# Patient Record
Sex: FEMALE | Race: WHITE | NOT HISPANIC OR LATINO | Employment: FULL TIME | URBAN - METROPOLITAN AREA
[De-identification: names, ages, dates, MRNs, and addresses within clinical notes are randomized per-mention and may not be internally consistent; named-entity substitution may affect disease eponyms.]

---

## 2017-12-04 ENCOUNTER — OFFICE VISIT (OUTPATIENT)
Dept: URGENT CARE | Facility: PHYSICIAN GROUP | Age: 47
End: 2017-12-04
Payer: COMMERCIAL

## 2017-12-04 ENCOUNTER — HOSPITAL ENCOUNTER (OUTPATIENT)
Dept: RADIOLOGY | Facility: MEDICAL CENTER | Age: 47
End: 2017-12-04
Attending: EMERGENCY MEDICINE
Payer: COMMERCIAL

## 2017-12-04 VITALS
RESPIRATION RATE: 16 BRPM | HEIGHT: 62 IN | WEIGHT: 128 LBS | DIASTOLIC BLOOD PRESSURE: 72 MMHG | BODY MASS INDEX: 23.55 KG/M2 | TEMPERATURE: 98.2 F | OXYGEN SATURATION: 98 % | HEART RATE: 99 BPM | SYSTOLIC BLOOD PRESSURE: 132 MMHG

## 2017-12-04 DIAGNOSIS — S16.1XXA STRAIN OF NECK MUSCLE, INITIAL ENCOUNTER: ICD-10-CM

## 2017-12-04 PROCEDURE — 99213 OFFICE O/P EST LOW 20 MIN: CPT | Performed by: EMERGENCY MEDICINE

## 2017-12-04 PROCEDURE — 72040 X-RAY EXAM NECK SPINE 2-3 VW: CPT

## 2017-12-04 RX ORDER — HYDROCODONE BITARTRATE AND ACETAMINOPHEN 5; 325 MG/1; MG/1
1 TABLET ORAL EVERY 6 HOURS PRN
Qty: 5 TAB | Refills: 0 | Status: SHIPPED | OUTPATIENT
Start: 2017-12-04 | End: 2018-01-05

## 2017-12-04 RX ORDER — METHOCARBAMOL 750 MG/1
1500 TABLET, FILM COATED ORAL 3 TIMES DAILY
Qty: 60 TAB | Refills: 0 | Status: SHIPPED | OUTPATIENT
Start: 2017-12-04 | End: 2018-01-05

## 2017-12-04 ASSESSMENT — ENCOUNTER SYMPTOMS
MYALGIAS: 1
NAUSEA: 0
VOMITING: 0
CHILLS: 0
FEVER: 0
NECK PAIN: 1
TREMORS: 0
EYE REDNESS: 0
EYE DISCHARGE: 0
FALLS: 0
SENSORY CHANGE: 0
COUGH: 0
NERVOUS/ANXIOUS: 0

## 2017-12-05 NOTE — PROGRESS NOTES
Subjective:      Madison Cardona is a 47 y.o. female who presents with Neck Pain (and L shoulder pain x1 week, was injured 15 years ago)            HPI  Patient is a pleasant 47-year-old female complaining of acute onset of left sided neck and shoulder pain approximately a week ago. The pain came on without any specific injury, she did go to a chiropractor without much relief. Should problems she's had for the past 15 years approximately every 3 years since injuring her left neck and shoulder at a picnic where her  is worried.  No Known Allergies  Social History     Social History   • Marital status:      Spouse name: N/A   • Number of children: N/A   • Years of education: N/A     Occupational History   • Not on file.     Social History Main Topics   • Smoking status: Never Smoker   • Smokeless tobacco: Never Used   • Alcohol use 1.2 oz/week     2 Glasses of wine per week      Comment: occ.   • Drug use: No   • Sexual activity: Yes     Partners: Male     Other Topics Concern   • Not on file     Social History Narrative   • No narrative on file   History reviewed. No pertinent past medical history..    Current Outpatient Prescriptions on File Prior to Visit   Medication Sig Dispense Refill   • cyanocobalamin (VITAMIN B-12) 100 MCG Tab Take 100 mcg by mouth every day.     • vitamin D (CHOLECALCIFEROL) 1000 UNIT Tab Take 1,000 Units by mouth every day.     • Multiple Vitamins-Minerals (MULTIVITAMIN & MINERAL PO) Take  by mouth.       No current facility-administered medications on file prior to visit.      Family History   Problem Relation Age of Onset   • Cancer Mother      breast   • Alcohol/Drug Father      heroin addict   • Lung Disease Father    • Alcohol/Drug Paternal Aunt      Review of Systems   Constitutional: Negative for chills and fever.   Eyes: Negative for discharge and redness.   Respiratory: Negative for cough.    Cardiovascular: Negative for chest pain.   Gastrointestinal: Negative for  "nausea and vomiting.   Musculoskeletal: Positive for myalgias and neck pain. Negative for falls.   Skin: Negative for rash.   Neurological: Negative for tremors and sensory change.   Psychiatric/Behavioral: The patient is not nervous/anxious.           Objective:     /72   Pulse 99   Temp 36.8 °C (98.2 °F)   Resp 16   Ht 1.575 m (5' 2\")   Wt 58.1 kg (128 lb)   SpO2 98%   Breastfeeding? No   BMI 23.41 kg/m²      Physical Exam   Constitutional: She appears well-developed and well-nourished. She appears distressed.   HENT:   Head: Normocephalic and atraumatic.   Right Ear: External ear normal.   Left Ear: External ear normal.   Eyes: Right eye exhibits no discharge. Left eye exhibits no discharge.   Neck: No thyromegaly present.   Tender on mild left paraspinous musculature C4-C7. Tender in the left trapezius in addition.   Skin: She is not diaphoretic.               Assessment/Plan:     1. Strain of neck muscle, initial encounter    - SLINGS    Patient have an x-ray today as well as he placed his sling she seems to have slight improvement while in the sling. Over the next 4-5 days she will rest her left upper extremity. She'll have Norco to use at night.    Historically this has resolved after a period of 5 minus days and hopefully that will be the case if not she needs to follow-up with her primary care provider.  "

## 2018-01-05 ENCOUNTER — HOSPITAL ENCOUNTER (OUTPATIENT)
Dept: RADIOLOGY | Facility: MEDICAL CENTER | Age: 48
DRG: 473 | End: 2018-01-05
Attending: ORTHOPAEDIC SURGERY | Admitting: ORTHOPAEDIC SURGERY
Payer: COMMERCIAL

## 2018-01-05 DIAGNOSIS — Z01.810 PRE-OPERATIVE CARDIOVASCULAR EXAMINATION: ICD-10-CM

## 2018-01-05 DIAGNOSIS — Z01.812 PRE-OPERATIVE LABORATORY EXAMINATION: ICD-10-CM

## 2018-01-05 DIAGNOSIS — Z01.811 PRE-OPERATIVE RESPIRATORY EXAMINATION: ICD-10-CM

## 2018-01-05 LAB
ANION GAP SERPL CALC-SCNC: 8 MMOL/L (ref 0–11.9)
APPEARANCE UR: CLEAR
BACTERIA #/AREA URNS HPF: NEGATIVE /HPF
BASOPHILS # BLD AUTO: 0.5 % (ref 0–1.8)
BASOPHILS # BLD: 0.03 K/UL (ref 0–0.12)
BILIRUB UR QL STRIP.AUTO: NEGATIVE
BUN SERPL-MCNC: 17 MG/DL (ref 8–22)
CALCIUM SERPL-MCNC: 9 MG/DL (ref 8.5–10.5)
CHLORIDE SERPL-SCNC: 104 MMOL/L (ref 96–112)
CO2 SERPL-SCNC: 26 MMOL/L (ref 20–33)
COLOR UR: YELLOW
CREAT SERPL-MCNC: 1.02 MG/DL (ref 0.5–1.4)
CULTURE IF INDICATED INDCX: YES UA CULTURE
EKG IMPRESSION: NORMAL
EOSINOPHIL # BLD AUTO: 0.09 K/UL (ref 0–0.51)
EOSINOPHIL NFR BLD: 1.6 % (ref 0–6.9)
EPI CELLS #/AREA URNS HPF: NEGATIVE /HPF
ERYTHROCYTE [DISTWIDTH] IN BLOOD BY AUTOMATED COUNT: 45.2 FL (ref 35.9–50)
GFR SERPL CREATININE-BSD FRML MDRD: 58 ML/MIN/1.73 M 2
GLUCOSE SERPL-MCNC: 92 MG/DL (ref 65–99)
GLUCOSE UR STRIP.AUTO-MCNC: NEGATIVE MG/DL
HCT VFR BLD AUTO: 42.1 % (ref 37–47)
HGB BLD-MCNC: 13.9 G/DL (ref 12–16)
HYALINE CASTS #/AREA URNS LPF: NORMAL /LPF
IMM GRANULOCYTES # BLD AUTO: 0.02 K/UL (ref 0–0.11)
IMM GRANULOCYTES NFR BLD AUTO: 0.4 % (ref 0–0.9)
KETONES UR STRIP.AUTO-MCNC: NEGATIVE MG/DL
LEUKOCYTE ESTERASE UR QL STRIP.AUTO: ABNORMAL
LYMPHOCYTES # BLD AUTO: 1.98 K/UL (ref 1–4.8)
LYMPHOCYTES NFR BLD: 35.3 % (ref 22–41)
MCH RBC QN AUTO: 31.4 PG (ref 27–33)
MCHC RBC AUTO-ENTMCNC: 33 G/DL (ref 33.6–35)
MCV RBC AUTO: 95.2 FL (ref 81.4–97.8)
MICRO URNS: ABNORMAL
MONOCYTES # BLD AUTO: 0.41 K/UL (ref 0–0.85)
MONOCYTES NFR BLD AUTO: 7.3 % (ref 0–13.4)
NEUTROPHILS # BLD AUTO: 3.08 K/UL (ref 2–7.15)
NEUTROPHILS NFR BLD: 54.9 % (ref 44–72)
NITRITE UR QL STRIP.AUTO: NEGATIVE
NRBC # BLD AUTO: 0 K/UL
NRBC BLD-RTO: 0 /100 WBC
PH UR STRIP.AUTO: 8 [PH]
PLATELET # BLD AUTO: 146 K/UL (ref 164–446)
PMV BLD AUTO: 9.6 FL (ref 9–12.9)
POTASSIUM SERPL-SCNC: 3.6 MMOL/L (ref 3.6–5.5)
PROT UR QL STRIP: NEGATIVE MG/DL
RBC # BLD AUTO: 4.42 M/UL (ref 4.2–5.4)
RBC # URNS HPF: NORMAL /HPF
RBC UR QL AUTO: NEGATIVE
SODIUM SERPL-SCNC: 138 MMOL/L (ref 135–145)
SP GR UR STRIP.AUTO: 1.02
UROBILINOGEN UR STRIP.AUTO-MCNC: 1 MG/DL
WBC # BLD AUTO: 5.6 K/UL (ref 4.8–10.8)
WBC #/AREA URNS HPF: NORMAL /HPF

## 2018-01-05 PROCEDURE — 71045 X-RAY EXAM CHEST 1 VIEW: CPT

## 2018-01-05 PROCEDURE — 36415 COLL VENOUS BLD VENIPUNCTURE: CPT

## 2018-01-05 PROCEDURE — 81001 URINALYSIS AUTO W/SCOPE: CPT

## 2018-01-05 PROCEDURE — 87086 URINE CULTURE/COLONY COUNT: CPT

## 2018-01-05 PROCEDURE — 93005 ELECTROCARDIOGRAM TRACING: CPT

## 2018-01-05 PROCEDURE — 93010 ELECTROCARDIOGRAM REPORT: CPT | Performed by: INTERNAL MEDICINE

## 2018-01-05 PROCEDURE — 80048 BASIC METABOLIC PNL TOTAL CA: CPT

## 2018-01-05 PROCEDURE — 85025 COMPLETE CBC W/AUTO DIFF WBC: CPT

## 2018-01-07 LAB
BACTERIA UR CULT: NORMAL
SIGNIFICANT IND 70042: NORMAL
SITE SITE: NORMAL
SOURCE SOURCE: NORMAL

## 2018-01-10 ENCOUNTER — APPOINTMENT (OUTPATIENT)
Dept: RADIOLOGY | Facility: MEDICAL CENTER | Age: 48
DRG: 473 | End: 2018-01-10
Attending: ORTHOPAEDIC SURGERY
Payer: COMMERCIAL

## 2018-01-10 ENCOUNTER — HOSPITAL ENCOUNTER (INPATIENT)
Facility: MEDICAL CENTER | Age: 48
LOS: 1 days | DRG: 473 | End: 2018-01-11
Attending: ORTHOPAEDIC SURGERY | Admitting: ORTHOPAEDIC SURGERY
Payer: COMMERCIAL

## 2018-01-10 PROBLEM — M48.02 SPINAL STENOSIS IN CERVICAL REGION: Status: ACTIVE | Noted: 2018-01-10

## 2018-01-10 PROCEDURE — 01N10ZZ RELEASE CERVICAL NERVE, OPEN APPROACH: ICD-10-PCS | Performed by: ORTHOPAEDIC SURGERY

## 2018-01-10 PROCEDURE — 72040 X-RAY EXAM NECK SPINE 2-3 VW: CPT

## 2018-01-10 PROCEDURE — 700111 HCHG RX REV CODE 636 W/ 250 OVERRIDE (IP)

## 2018-01-10 PROCEDURE — 700101 HCHG RX REV CODE 250

## 2018-01-10 PROCEDURE — 160002 HCHG RECOVERY MINUTES (STAT): Performed by: ORTHOPAEDIC SURGERY

## 2018-01-10 PROCEDURE — 160041 HCHG SURGERY MINUTES - EA ADDL 1 MIN LEVEL 4: Performed by: ORTHOPAEDIC SURGERY

## 2018-01-10 PROCEDURE — 160035 HCHG PACU - 1ST 60 MINS PHASE I: Performed by: ORTHOPAEDIC SURGERY

## 2018-01-10 PROCEDURE — 160036 HCHG PACU - EA ADDL 30 MINS PHASE I: Performed by: ORTHOPAEDIC SURGERY

## 2018-01-10 PROCEDURE — A6402 STERILE GAUZE <= 16 SQ IN: HCPCS | Performed by: ORTHOPAEDIC SURGERY

## 2018-01-10 PROCEDURE — C1713 ANCHOR/SCREW BN/BN,TIS/BN: HCPCS | Performed by: ORTHOPAEDIC SURGERY

## 2018-01-10 PROCEDURE — 95861 NEEDLE EMG 2 EXTREMITIES: CPT | Performed by: ORTHOPAEDIC SURGERY

## 2018-01-10 PROCEDURE — 4A11X4G MONITORING OF PERIPHERAL NERVOUS ELECTRICAL ACTIVITY, INTRAOPERATIVE, EXTERNAL APPROACH: ICD-10-PCS | Performed by: ORTHOPAEDIC SURGERY

## 2018-01-10 PROCEDURE — 95927 SOMATOSENSORY TESTING: CPT | Performed by: ORTHOPAEDIC SURGERY

## 2018-01-10 PROCEDURE — 0RB30ZZ EXCISION OF CERVICAL VERTEBRAL DISC, OPEN APPROACH: ICD-10-PCS | Performed by: ORTHOPAEDIC SURGERY

## 2018-01-10 PROCEDURE — 700102 HCHG RX REV CODE 250 W/ 637 OVERRIDE(OP)

## 2018-01-10 PROCEDURE — 95940 IONM IN OPERATNG ROOM 15 MIN: CPT | Performed by: ORTHOPAEDIC SURGERY

## 2018-01-10 PROCEDURE — 160029 HCHG SURGERY MINUTES - 1ST 30 MINS LEVEL 4: Performed by: ORTHOPAEDIC SURGERY

## 2018-01-10 PROCEDURE — 95937 NEUROMUSCULAR JUNCTION TEST: CPT | Performed by: ORTHOPAEDIC SURGERY

## 2018-01-10 PROCEDURE — 95939 C MOTOR EVOKED UPR&LWR LIMBS: CPT | Performed by: ORTHOPAEDIC SURGERY

## 2018-01-10 PROCEDURE — 160048 HCHG OR STATISTICAL LEVEL 1-5: Performed by: ORTHOPAEDIC SURGERY

## 2018-01-10 PROCEDURE — 0RG10A0 FUSION OF CERVICAL VERTEBRAL JOINT WITH INTERBODY FUSION DEVICE, ANTERIOR APPROACH, ANTERIOR COLUMN, OPEN APPROACH: ICD-10-PCS | Performed by: ORTHOPAEDIC SURGERY

## 2018-01-10 PROCEDURE — 770006 HCHG ROOM/CARE - MED/SURG/GYN SEMI*

## 2018-01-10 PROCEDURE — A9270 NON-COVERED ITEM OR SERVICE: HCPCS

## 2018-01-10 PROCEDURE — 95938 SOMATOSENSORY TESTING: CPT | Performed by: ORTHOPAEDIC SURGERY

## 2018-01-10 PROCEDURE — 502000 HCHG MISC OR IMPLANTS RC 0278: Performed by: ORTHOPAEDIC SURGERY

## 2018-01-10 PROCEDURE — 110454 HCHG SHELL REV 250: Performed by: ORTHOPAEDIC SURGERY

## 2018-01-10 PROCEDURE — 501838 HCHG SUTURE GENERAL: Performed by: ORTHOPAEDIC SURGERY

## 2018-01-10 PROCEDURE — 160009 HCHG ANES TIME/MIN: Performed by: ORTHOPAEDIC SURGERY

## 2018-01-10 PROCEDURE — 500367 HCHG DRAIN KIT, HEMOVAC: Performed by: ORTHOPAEDIC SURGERY

## 2018-01-10 PROCEDURE — 500864 HCHG NEEDLE, SPINAL 18G: Performed by: ORTHOPAEDIC SURGERY

## 2018-01-10 PROCEDURE — 700111 HCHG RX REV CODE 636 W/ 250 OVERRIDE (IP): Performed by: PHYSICIAN ASSISTANT

## 2018-01-10 PROCEDURE — A9270 NON-COVERED ITEM OR SERVICE: HCPCS | Performed by: PHYSICIAN ASSISTANT

## 2018-01-10 PROCEDURE — 500122 HCHG BOVIE, BLADE: Performed by: ORTHOPAEDIC SURGERY

## 2018-01-10 PROCEDURE — 700102 HCHG RX REV CODE 250 W/ 637 OVERRIDE(OP): Performed by: PHYSICIAN ASSISTANT

## 2018-01-10 DEVICE — IMPLANTABLE DEVICE: Type: IMPLANTABLE DEVICE | Status: FUNCTIONAL

## 2018-01-10 RX ORDER — DIPHENHYDRAMINE HCL 25 MG
25 TABLET ORAL EVERY 6 HOURS PRN
Status: DISCONTINUED | OUTPATIENT
Start: 2018-01-10 | End: 2018-01-11 | Stop reason: HOSPADM

## 2018-01-10 RX ORDER — LIDOCAINE HYDROCHLORIDE 10 MG/ML
INJECTION, SOLUTION INFILTRATION; PERINEURAL
Status: COMPLETED
Start: 2018-01-10 | End: 2018-01-10

## 2018-01-10 RX ORDER — DIAZEPAM 5 MG/1
5 TABLET ORAL EVERY 6 HOURS PRN
Status: DISCONTINUED | OUTPATIENT
Start: 2018-01-10 | End: 2018-01-11 | Stop reason: HOSPADM

## 2018-01-10 RX ORDER — GABAPENTIN 300 MG/1
CAPSULE ORAL
Status: COMPLETED
Start: 2018-01-10 | End: 2018-01-10

## 2018-01-10 RX ORDER — OXYCODONE HCL 20 MG/1
TABLET, FILM COATED, EXTENDED RELEASE ORAL
Status: COMPLETED
Start: 2018-01-10 | End: 2018-01-10

## 2018-01-10 RX ORDER — PROMETHAZINE HYDROCHLORIDE 12.5 MG/1
12.5-25 SUPPOSITORY RECTAL EVERY 4 HOURS PRN
Status: DISCONTINUED | OUTPATIENT
Start: 2018-01-10 | End: 2018-01-11 | Stop reason: HOSPADM

## 2018-01-10 RX ORDER — PROMETHAZINE HYDROCHLORIDE 25 MG/1
12.5-25 TABLET ORAL EVERY 4 HOURS PRN
Status: DISCONTINUED | OUTPATIENT
Start: 2018-01-10 | End: 2018-01-11 | Stop reason: HOSPADM

## 2018-01-10 RX ORDER — DIPHENHYDRAMINE HYDROCHLORIDE 50 MG/ML
25 INJECTION INTRAMUSCULAR; INTRAVENOUS EVERY 6 HOURS PRN
Status: DISCONTINUED | OUTPATIENT
Start: 2018-01-10 | End: 2018-01-11 | Stop reason: HOSPADM

## 2018-01-10 RX ORDER — BISACODYL 10 MG
10 SUPPOSITORY, RECTAL RECTAL
Status: DISCONTINUED | OUTPATIENT
Start: 2018-01-10 | End: 2018-01-11 | Stop reason: HOSPADM

## 2018-01-10 RX ORDER — OXYCODONE HYDROCHLORIDE 5 MG/1
5 TABLET ORAL
Status: DISCONTINUED | OUTPATIENT
Start: 2018-01-10 | End: 2018-01-11 | Stop reason: HOSPADM

## 2018-01-10 RX ORDER — ACETAMINOPHEN 500 MG
TABLET ORAL
Status: COMPLETED
Start: 2018-01-10 | End: 2018-01-10

## 2018-01-10 RX ORDER — ONDANSETRON 4 MG/1
4 TABLET, ORALLY DISINTEGRATING ORAL EVERY 4 HOURS PRN
Status: DISCONTINUED | OUTPATIENT
Start: 2018-01-10 | End: 2018-01-11 | Stop reason: HOSPADM

## 2018-01-10 RX ORDER — AMOXICILLIN 250 MG
1 CAPSULE ORAL NIGHTLY
Status: DISCONTINUED | OUTPATIENT
Start: 2018-01-10 | End: 2018-01-11 | Stop reason: HOSPADM

## 2018-01-10 RX ORDER — AMOXICILLIN 250 MG
1 CAPSULE ORAL
Status: DISCONTINUED | OUTPATIENT
Start: 2018-01-10 | End: 2018-01-11 | Stop reason: HOSPADM

## 2018-01-10 RX ORDER — DOCUSATE SODIUM 100 MG/1
100 CAPSULE, LIQUID FILLED ORAL 2 TIMES DAILY
Status: DISCONTINUED | OUTPATIENT
Start: 2018-01-10 | End: 2018-01-11 | Stop reason: HOSPADM

## 2018-01-10 RX ORDER — POLYETHYLENE GLYCOL 3350 17 G/17G
1 POWDER, FOR SOLUTION ORAL 2 TIMES DAILY PRN
Status: DISCONTINUED | OUTPATIENT
Start: 2018-01-10 | End: 2018-01-11 | Stop reason: HOSPADM

## 2018-01-10 RX ORDER — BUPIVACAINE HYDROCHLORIDE AND EPINEPHRINE 5; 5 MG/ML; UG/ML
INJECTION, SOLUTION EPIDURAL; INTRACAUDAL; PERINEURAL
Status: DISCONTINUED | OUTPATIENT
Start: 2018-01-10 | End: 2018-01-10 | Stop reason: HOSPADM

## 2018-01-10 RX ORDER — HYDROMORPHONE HYDROCHLORIDE 2 MG/ML
0.5 INJECTION, SOLUTION INTRAMUSCULAR; INTRAVENOUS; SUBCUTANEOUS
Status: DISCONTINUED | OUTPATIENT
Start: 2018-01-10 | End: 2018-01-11 | Stop reason: HOSPADM

## 2018-01-10 RX ORDER — SODIUM CHLORIDE, SODIUM LACTATE, POTASSIUM CHLORIDE, CALCIUM CHLORIDE 600; 310; 30; 20 MG/100ML; MG/100ML; MG/100ML; MG/100ML
1000 INJECTION, SOLUTION INTRAVENOUS
Status: COMPLETED | OUTPATIENT
Start: 2018-01-10 | End: 2018-01-10

## 2018-01-10 RX ORDER — ENEMA 19; 7 G/133ML; G/133ML
1 ENEMA RECTAL
Status: DISCONTINUED | OUTPATIENT
Start: 2018-01-10 | End: 2018-01-11 | Stop reason: HOSPADM

## 2018-01-10 RX ORDER — OXYCODONE HCL 5 MG/5 ML
SOLUTION, ORAL ORAL
Status: COMPLETED
Start: 2018-01-10 | End: 2018-01-10

## 2018-01-10 RX ORDER — ONDANSETRON 2 MG/ML
4 INJECTION INTRAMUSCULAR; INTRAVENOUS EVERY 4 HOURS PRN
Status: DISCONTINUED | OUTPATIENT
Start: 2018-01-10 | End: 2018-01-11 | Stop reason: HOSPADM

## 2018-01-10 RX ORDER — BACITRACIN 50000 [IU]/1
INJECTION, POWDER, FOR SOLUTION INTRAMUSCULAR
Status: DISCONTINUED | OUTPATIENT
Start: 2018-01-10 | End: 2018-01-10 | Stop reason: HOSPADM

## 2018-01-10 RX ORDER — ACETAMINOPHEN 500 MG
1000 TABLET ORAL EVERY 6 HOURS
Status: DISCONTINUED | OUTPATIENT
Start: 2018-01-10 | End: 2018-01-11 | Stop reason: HOSPADM

## 2018-01-10 RX ADMIN — SODIUM CHLORIDE, SODIUM LACTATE, POTASSIUM CHLORIDE, CALCIUM CHLORIDE 1000 ML: 600; 310; 30; 20 INJECTION, SOLUTION INTRAVENOUS at 12:30

## 2018-01-10 RX ADMIN — CEFAZOLIN SODIUM 1 G: 1 INJECTION, SOLUTION INTRAVENOUS at 20:58

## 2018-01-10 RX ADMIN — GABAPENTIN 600 MG: 300 CAPSULE ORAL at 12:15

## 2018-01-10 RX ADMIN — ACETAMINOPHEN 1000 MG: 500 TABLET, FILM COATED ORAL at 23:17

## 2018-01-10 RX ADMIN — OXYCODONE HYDROCHLORIDE 20 MG: 20 TABLET, FILM COATED, EXTENDED RELEASE ORAL at 12:15

## 2018-01-10 RX ADMIN — FENTANYL CITRATE 25 MCG: 50 INJECTION, SOLUTION INTRAMUSCULAR; INTRAVENOUS at 15:50

## 2018-01-10 RX ADMIN — ACETAMINOPHEN 1000 MG: 500 TABLET, FILM COATED ORAL at 12:15

## 2018-01-10 RX ADMIN — ACETAMINOPHEN 1000 MG: 500 TABLET, FILM COATED ORAL at 18:11

## 2018-01-10 ASSESSMENT — PAIN SCALES - GENERAL
PAINLEVEL_OUTOF10: 0
PAINLEVEL_OUTOF10: 2

## 2018-01-10 ASSESSMENT — COPD QUESTIONNAIRES
DURING THE PAST 4 WEEKS HOW MUCH DID YOU FEEL SHORT OF BREATH: NONE/LITTLE OF THE TIME
DO YOU EVER COUGH UP ANY MUCUS OR PHLEGM?: NO/ONLY WITH OCCASIONAL COLDS OR INFECTIONS
HAVE YOU SMOKED AT LEAST 100 CIGARETTES IN YOUR ENTIRE LIFE: NO/DON'T KNOW
COPD SCREENING SCORE: 0

## 2018-01-10 ASSESSMENT — LIFESTYLE VARIABLES
HAVE YOU EVER FELT YOU SHOULD CUT DOWN ON YOUR DRINKING: NO
CONSUMPTION TOTAL: POSITIVE
HOW MANY TIMES IN THE PAST YEAR HAVE YOU HAD 5 OR MORE DRINKS IN A DAY: 4
DO YOU DRINK ALCOHOL: YES
EVER FELT BAD OR GUILTY ABOUT YOUR DRINKING: NO
ON A TYPICAL DAY WHEN YOU DRINK ALCOHOL HOW MANY DRINKS DO YOU HAVE: 2
TOTAL SCORE: 0
HAVE PEOPLE ANNOYED YOU BY CRITICIZING YOUR DRINKING: NO
EVER HAD A DRINK FIRST THING IN THE MORNING TO STEADY YOUR NERVES TO GET RID OF A HANGOVER: NO
TOTAL SCORE: 0
TOTAL SCORE: 0
AVERAGE NUMBER OF DAYS PER WEEK YOU HAVE A DRINK CONTAINING ALCOHOL: 6
EVER_SMOKED: NEVER

## 2018-01-10 ASSESSMENT — PATIENT HEALTH QUESTIONNAIRE - PHQ9
2. FEELING DOWN, DEPRESSED, IRRITABLE, OR HOPELESS: NOT AT ALL
1. LITTLE INTEREST OR PLEASURE IN DOING THINGS: NOT AT ALL
SUM OF ALL RESPONSES TO PHQ QUESTIONS 1-9: 0
SUM OF ALL RESPONSES TO PHQ9 QUESTIONS 1 AND 2: 0

## 2018-01-11 VITALS
WEIGHT: 123.02 LBS | DIASTOLIC BLOOD PRESSURE: 73 MMHG | RESPIRATION RATE: 16 BRPM | TEMPERATURE: 96.1 F | BODY MASS INDEX: 22.64 KG/M2 | HEIGHT: 62 IN | OXYGEN SATURATION: 97 % | HEART RATE: 79 BPM | SYSTOLIC BLOOD PRESSURE: 108 MMHG

## 2018-01-11 PROCEDURE — 700102 HCHG RX REV CODE 250 W/ 637 OVERRIDE(OP): Performed by: PHYSICIAN ASSISTANT

## 2018-01-11 PROCEDURE — G8979 MOBILITY GOAL STATUS: HCPCS | Mod: CI

## 2018-01-11 PROCEDURE — G8987 SELF CARE CURRENT STATUS: HCPCS | Mod: CI

## 2018-01-11 PROCEDURE — 97165 OT EVAL LOW COMPLEX 30 MIN: CPT

## 2018-01-11 PROCEDURE — G8988 SELF CARE GOAL STATUS: HCPCS | Mod: CI

## 2018-01-11 PROCEDURE — 700111 HCHG RX REV CODE 636 W/ 250 OVERRIDE (IP): Performed by: PHYSICIAN ASSISTANT

## 2018-01-11 PROCEDURE — A9270 NON-COVERED ITEM OR SERVICE: HCPCS | Performed by: PHYSICIAN ASSISTANT

## 2018-01-11 PROCEDURE — G8980 MOBILITY D/C STATUS: HCPCS | Mod: CI

## 2018-01-11 PROCEDURE — 97161 PT EVAL LOW COMPLEX 20 MIN: CPT

## 2018-01-11 PROCEDURE — G8978 MOBILITY CURRENT STATUS: HCPCS | Mod: CI

## 2018-01-11 PROCEDURE — G8989 SELF CARE D/C STATUS: HCPCS | Mod: CI

## 2018-01-11 PROCEDURE — 700112 HCHG RX REV CODE 229: Performed by: PHYSICIAN ASSISTANT

## 2018-01-11 RX ADMIN — CEFAZOLIN SODIUM 1 G: 1 INJECTION, SOLUTION INTRAVENOUS at 05:30

## 2018-01-11 RX ADMIN — ACETAMINOPHEN 1000 MG: 500 TABLET, FILM COATED ORAL at 11:35

## 2018-01-11 RX ADMIN — DOCUSATE SODIUM 100 MG: 100 CAPSULE ORAL at 05:30

## 2018-01-11 RX ADMIN — ACETAMINOPHEN 1000 MG: 500 TABLET, FILM COATED ORAL at 05:29

## 2018-01-11 RX ADMIN — DIAZEPAM 5 MG: 5 TABLET ORAL at 03:41

## 2018-01-11 ASSESSMENT — GAIT ASSESSMENTS
DISTANCE (FEET): 125
GAIT LEVEL OF ASSIST: SUPERVISED

## 2018-01-11 ASSESSMENT — PAIN SCALES - GENERAL
PAINLEVEL_OUTOF10: 2
PAINLEVEL_OUTOF10: 2
PAINLEVEL_OUTOF10: 3
PAINLEVEL_OUTOF10: 2

## 2018-01-11 ASSESSMENT — COGNITIVE AND FUNCTIONAL STATUS - GENERAL
MOVING TO AND FROM BED TO CHAIR: A LITTLE
TURNING FROM BACK TO SIDE WHILE IN FLAT BAD: A LITTLE
CLIMB 3 TO 5 STEPS WITH RAILING: A LITTLE
SUGGESTED CMS G CODE MODIFIER DAILY ACTIVITY: CI
SUGGESTED CMS G CODE MODIFIER MOBILITY: CK
STANDING UP FROM CHAIR USING ARMS: A LITTLE
WALKING IN HOSPITAL ROOM: A LITTLE
DRESSING REGULAR UPPER BODY CLOTHING: A LITTLE
MOBILITY SCORE: 18
DAILY ACTIVITIY SCORE: 23
MOVING FROM LYING ON BACK TO SITTING ON SIDE OF FLAT BED: A LITTLE

## 2018-01-11 ASSESSMENT — ACTIVITIES OF DAILY LIVING (ADL): TOILETING: INDEPENDENT

## 2018-01-11 NOTE — THERAPY
"Physical Therapy Evaluation completed.   Bed Mobility:  Supine to Sit: Supervised  Transfers: Sit to Stand: Supervised  Gait: Level Of Assist: Supervised with No Equipment Needed       Plan of Care: Patient with no further skilled PT needs in the acute care setting at this time  Discharge Recommendations: Equipment: No Equipment Needed. Post-acute therapy Discharge to home with outpatient or home health for additional skilled therapy services.    Ms. Cardona is a 46 y/o female who presents to acute secondary to anterior discectomy and fusion C4/5. She presents with lower extremity strength that is equal and WFL. No sensation deficits in LE. She was able to perform gait, transfers, and bed mobility without physical assist. No LOB when standing and ambulating. Reviewed spinal precautions and adjusted pt's cervical collar for comfort. Discussed scap squeezes to reduce pain which pt performed correctly and experienced pain relief by performing. No additional acute physical therapy needs at this time. Recommend follow up with outpatient physical therapy services when cleared by MD to address neck ROM and postural weakness.     See \"Rehab Therapy-Acute\" Patient Summary Report for complete documentation.     "

## 2018-01-11 NOTE — OR NURSING
Pt via noe, accompanied by transport, was transferred to [facility] at 1638. All personal belongings sent with Pt.

## 2018-01-11 NOTE — OP REPORT
Date: 1/10/18  Pre-operative Diagnosis:     C4/5 Cervical Degenerative Disc Disease    Cervical Stenosis  Post-operative Diagnosis: Same  Procedure:     C4/5 Anterior Cervical Discectomy and Fusion with Instrumentation    Structural use of morselized local autograft bone    Structural use of allograft    Use of intra-operative fluoroscopy    Use of intra-operative neuromonitoring, including EMG, MEP, and SSEP modalities    Use of a surgical microscope  Surgeon: Elvis De Leon MD  Assistant: Marlen Mcginnis PA-C  Anesthesia: General endotracheal anesthesia  Findings: As expected - degenerative disc disease and stenosis  Estimated Blood Loss: 5mL  Drains: Hemovac  Specimens: None  Complications: None  Condition: Transferred to PACU in stable condition.  Implants: DePuy    Plate: size 14    Screws: 16mm C4, 14mm C5    Interbody Graft: size 6 cage    Autograft: local bone from end plate preparation used structurally in the interbody cage.    Indications:  The patient was seen in my office and diagnosed with cervical degenerative disc disease and cervical stenosis. I have explained all options of treatment for the patient, and the patient has elected to proceed with operative management. I have explained all risks, benefits, and alternatives of the proposed procedure. The risks that we have discussed include death, blindness, bleeding, nerve damage, infection, dural tear, failure of surgery to alleviate pre-operative symptoms, nonunion, and possible need for further operation. I also discussed in detail the possibility of dysphagia, superior laryngeal and/or hypoglossal nerve injury, carotid or vertebral artery damage, Anahi’s syndrome, C5 palsy specifically, and paralysis. I have described the surgical procedure using an acrylic model. I explained separately the risks of allograft, including infection and disease transfer. In addition to the aforementioned procedure, I discussed with the patient that other procedures  may be indicated during the course of surgery. The patient expressed understanding of this and agreed to move forward with operative management.    Pre-surgical:  The proposed incision site was marked in the pre-operative holding area by me. The patient was then taken to the operating room in stable condition. Following smooth induction of general anesthesia, the patient was positioned supine on a Dustin table with all down surfaces well-padded. A small towel was placed between the scapulae and the neck was placed in slight extension with the head resting on a donut pillow. The patient was then prepped and draped in the usual sterile fashion. Pre-operative antibiotics and tranexamic acid were administered within one hour of the incision. A surgical timeout was performed, and all parties involved in the procedure were in agreement on the correct patient, location, and procedure to be performed.    Approach:  The proposed levels were identified using C-arm fluoroscopy and the skin was marked for the proposed incision. Based on anatomy and pathology, a left-sided approach was taken. A natural skin fold was selected near the proposed incision site to allow for optimal cosmesis, reaching from the medial border of the sternocleidomastoid to near midline. The skin was then incised sharply through the dermis. Bipolar cautery was used to dissect the subdermal fat layer down to the platysma and to coagulate bleeding vessels. A small rent was created in the platysma and the muscle was spread in line with the fibers. A Metzenbaum scissors was used to spread soft tissues in a plane just deep to the platysma. Using a forceps, the layer just deep to the platysma was defined further using a Metzenbaum scissors as a dissector. A self-retractor was then placed to allow for further visualization. The medial border of the sternocleidomastoid muscle was identified and retracted laterally. The carotid sheath was identified and retracted  laterally. The strap muscles and midline structures were retracted medially, and any crossing vessels were coagulated or tied off with 3-0 silk suture, depending on their caliber and location in relation to the anticipated fusion levels. The pretracheal fascia and longus colli muscles were then identified.    Secondary pause and retraction:  An 18-gauge spinal needle was then bent twice to allow the terminal 1cm of the needle to be inserted safely into a vertebra. The needle was inserted into the center of a vertebral body within the operative field. A lateral radiograph was taken for identification purposes. A secondary “spinal pause” was then performed, and the level was confirmed with all parties participating in the operation. Once confirmed, the needle was removed and the vertebra was marked using electrocautery. A cervical self-retracting system was selected with appropriate depth and placed into the surgical field. Care was taken to retract and protect all areas of the esophagus without placing undue pressure for an extended period of time. The endotracheal cuff was then deflated and re-inflated to a lower pressure until tracheal retraction was removed. Of note, there was substantial longus colli hypertrophy over the C5 body. This made the dissection somewhat more difficult. The C4 body appeared normal.    Disc preparation:  The longus colli muscles were then gently elevated using a periosteal elevator on either side to allow for disc preparation. Small vessels were coagulated using bipolar cautery. A Millheim pin was then placed in each vertebral body above and below the disc space to be fused. Anterior osteophytes were removed using a Leksell rongeur. A Millheim pin distractor was then applied to both and gently distracted. A disc knife was then used to incise the anterior annulus, using the uncinated process as both a lateral border to dissection and protection for the vertebral artery. The annulus and  underlying nucleus was removed using curettes a disc punch. The posterior longitudinal ligament was then identified and removed from the posterior vertebral bodies using Kerrison rongeurs. A curved curette was used to remove any posterior osteophytes that could be palpated. Pedicles of the caudal level were identified and a cervical Gonzalez ball was used to palpate the foraminae. Using the uncinates as landmarks, a high-speed lorenzo was used to remove any remaining cartilage from the endplates create an adequate box in the disc space for interbody cage placement. Once adequate decompression and interspace preparation was achieved, attention was turned toward implant placement.    Implant placement:  Trial implants used for sizing were introduced into the prepared disc space in a sequential fashion. Once the appropriate size trial implant (6mm) was selected, the trial was removed. Autograft bone was collected from the burring process and used for fusion. Based on the trial implant, the same size final implant was packed with local autograft bone and gently tamped into place. The Tecumseh pin distractor and Tecumseh pins were then removed. Anterior cervical plates were trialed and the appropriate size 14 plate was applied across the disc space to be fused. Based on pre-surgical templating,  holes were drilled through the plate and into the vertebral bodies in an appropriate trajectory for the anticipated screws. Based on pre-surgical templating of the anterior-to-posterior length of the vertebral body, the appropriate screw sizes were selected. Screws were then placed in star-pattern, and the secondary locking mechanism was tightened using a torque-limited screwdriver. Based on the abnormal anatomy, the decision was made to place 16mm screws into C4 and 14mm screws into 16mm.    Retractor removal:  The superior laryngeal nerve and hypoglossal nerve were intentionally not visualized during the operation. The esophagus  was inspected carefully while all retractors were removed, and there was no sign of injury or damage to the esophagus.    Closure:  The surgical field was then copiously irrigated with sterile saline. A small drain was placed deep to the platysma and brought out of the skin laterally, in line with the incision. The drain was then sewn to skin. 2-0 interrupted sutures were used to repair the platysma. A separate 2-0 monofilament suture was then used to approximate the subdermal tissues, and the subcutaneous layer was repaired with a 3-0 monofilament in a running fashion. All sutures used were absorbable. Topical adhesive was then applied to the skin and allowed to dry. A sterile island dressing was applied over the surgical incision. A surgical count was performed before initiation of closure and following the procedure, and all were correct. I was present for the entire procedure.    Surgical microscope use:  A surgical microscope was utilized throughout the decompressive portion of this case. This was deemed necessary for safe and accurate surgical decompression of affected nerve roots.    Neuromonitoring:  SSEP, MEP and EMG were used throughout the case from incision until the beginning of closure. There were no significant changes throughout the case, and SSEP signals were at their pre-surgical baseline levels before surgical closure was initiated.    Surgical assist:  A surgical assistant was used throughout the case, and deemed necessary for safe neural retraction, hemostasis, and suction.    Recovery:  The patient was extubated uneventfully in the operating room. The patient was taken to the recovery room in stable condition. Sequential compression devices for VTE prophylaxis were applied to the patients’ lower extremities, and were ordered to be used while the patient was non-ambulatory. Chemical VTE prophylaxis was considered to be contraindicated for this patient because of the risk of bleeding near the  epidural space.    Terry De Leon MD

## 2018-01-11 NOTE — OR NURSING
Pt AA/Ox4. VSS. Dressing to anterior neck, CDI. CMS+. Pt moves all extremities. Pt reports 2/10 pain scale. Pain medication given. Pt denies numbness or tingling. No nausea or vomiting. Intact hemovac to anterior neck with no drainage in collection disc. Cervical collar in place. SCDs in place. Report given to ROSA Wagoner. Awaiting transport.

## 2018-01-11 NOTE — PROGRESS NOTES
Pt arrive to the unit in stable, call light within reach, pt ambulated to the bathroom and bed from [facility], two RN skin check complete, will continue to monitor     Surgical incision on anterior neck, otherwise skin intact

## 2018-01-11 NOTE — DISCHARGE INSTRUCTIONS
Follow up with Dr D eLeon in 2 weeks  C-Collar to stay on until follow up visit        Discharge Instructions    Discharged to home by car with escort. Discharged via wheelchair, hospital escort: Yes.  Special equipment needed: Not Applicable    Be sure to schedule a follow-up appointment with your primary care doctor or any specialists as instructed.     Discharge Plan:   Diet Plan: Discussed  Activity Level: Discussed  Confirmed Follow up Appointment: Patient to Call and Schedule Appointment  Confirmed Symptoms Management: Discussed  Medication Reconciliation Updated: Yes  Influenza Vaccine Indication: Patient Refuses    I understand that a diet low in cholesterol, fat, and sodium is recommended for good health. Unless I have been given specific instructions below for another diet, I accept this instruction as my diet prescription.   Other diet: healthy regular diet    Special Instructions: Discharge instructions for the Orthopedic Patient    Follow up with Primary Care Physician within 2 weeks of discharge to home, regarding:  Review of medications and diagnostic testing.  Surveillance for medical complications.  Workup and treatment of osteoporosis, if appropriate.     -Is this a Joint Replacement patient? No    -Is this patient being discharged with medication to prevent blood clots?  No    · Is patient discharged on Warfarin / Coumadin?   No     · Is patient Post Blood Transfusion?  No    Depression / Suicide Risk    As you are discharged from this [facility] facility, it is important to learn how to keep safe from harming yourself.    Recognize the warning signs:  · Abrupt changes in personality, positive or negative- including increase in energy   · Giving away possessions  · Change in eating patterns- significant weight changes-  positive or negative  · Change in sleeping patterns- unable to sleep or sleeping all the time   · Unwillingness or inability to communicate  · Depression  · Unusual sadness,  discouragement and loneliness  · Talk of wanting to die  · Neglect of personal appearance   · Rebelliousness- reckless behavior  · Withdrawal from people/activities they love  · Confusion- inability to concentrate     If you or a loved one observes any of these behaviors or has concerns about self-harm, here's what you can do:  · Talk about it- your feelings and reasons for harming yourself  · Remove any means that you might use to hurt yourself (examples: pills, rope, extension cords, firearm)  · Get professional help from the community (Mental Health, Substance Abuse, psychological counseling)  · Do not be alone:Call your Safe Contact- someone whom you trust who will be there for you.  · Call your local CRISIS HOTLINE 216-9604 or 180-548-3786  · Call your local Children's Mobile Crisis Response Team [place] (007) 414-5547 or www[facility]  · Call the toll free National Suicide Prevention Hotlines   · National Suicide Prevention Lifeline 848-397-PTTL (6903)  · [employer] Hope Line Network 800-SUICIDE (456-2213)      Cervical Fusion  The neck is the upper portion of your spine. The 7 bones in your neck are referred to as the cervical spine. Cervical fusion is a type of surgery that is done on the cervical spine to relieve pressure on the spinal cord or one or more nerve roots. There are two types of cervical fusion:  · Anterior cervical fusion. This surgery is done through the front (anterior) part of your neck. During the surgery the affected intervertebral disk is removed to take pressure off the nerves or spinal cord. The area where the disc was removed is filled with a bone graft that causes the vertebral bodies to grow together (fuse) over time.  · Posterior cervical fusion. This surgery is done through the back (posterior) of the neck. The surgery joins two or more neck vertebrae into one solid section of bone. Posterior cervical fusion is most commonly used to treat neck fractures and dislocations  and to fix deformities in the curve of the neck.  LET YOUR HEALTH CARE PROVIDER KNOW ABOUT:  · Any allergies you have.  · All medicines you are taking, including vitamins, herbs, eyedrops, creams, and over-the-counter medicines.  · Previous problems you or members of your family have had with the use of anesthetics.  · Any blood disorders or blood clotting problems you have.  · Previous surgeries you have had.  · Medical conditions you have.  RISKS AND COMPLICATIONS  Generally, this is a safe procedure. However, as with any procedure, problems can occur. Possible problems include:   · Infection.    · Bleeding with possible need for blood transfusion.    · Injury to surrounding structures, including nerves.    · Leakage of cerebrospinal fluid.    · Blood clots.  · Temporary breathing difficulties after surgery.  · Extended hospital stay, especially with posterior cervical fusion.  BEFORE THE PROCEDURE  · Do not eat or drink for 6-8 hours before the procedure.    · Take medicines as directed by your surgeon. Ask your surgeon about changing or stopping your regular medicines.    · You will be given antibiotic medicines to keep the infection rate down.    · The surgical cut (incision) site on your neck will be marked.    · Your neck will be cleaned to reduce the risk of infection.  PROCEDURE   The length of the procedure depends on what needs to be done. It usually takes 2 or more hours. For both procedures, you will be given medicine to make you sleep (general anesthetic). A breathing tube will be placed down your throat.   Anterior Cervical Fusion   · An incision will usually be made in a skin fold line at the front of your neck, in the area where the fusion will be placed.   · The neck muscles will be pushed aside.    · The surgeon will remove the affected, degenerated disk and bone spurs (decompression). This helps to take the pressure off the nerves and spinal cord.    · The area where the disk was removed is then  filled with a plastic spacer implant, bone graft, or both. These implants and bone grafts take the place of the disk and keep the nerve passageway open and clear for the nerves and spinal cord.    · In most cases, the surgeon will put metal plates, pins, or screws (hardware) in the neck to help stabilize the surgical site and to keep the implants and bone grafts in place. The hardware reduces motion at the surgical site, so the bones can grow together. This provides extra support to the neck.    Posterior Cervical Fusion   · An incision will be made through the back of the neck.    · Two or more neck vertebrae will be joined into one solid section of bone.    · Metal plates and pins or screws may be placed in the neck. These help stabilize the neck, providing extra support to help the bones to grow together more easily.  AFTER THE PROCEDURE  · You will stay in a recovery area until the anesthesia has worn off. Your blood pressure and pulse will be checked often.    · You may continue to receive fluids and medicines, such as antibiotics, through the IV tube for several days after the surgery.    · You may need to wear a neck or back brace for several weeks after surgery, especially when up and out of bed.    · You may be given pain medicine while still in the recovery area. Some pain is normal, but if your pain gets worse, tell your surgeon or nurse.    · Be up and moving as soon as possible after surgery. Physical therapists will help you start walking.    · To prevent blood clots in your legs:  ¨ You may be given compression stockings to wear.    ¨ You may need to take medicine to prevent clots.  · You may be asked to do breathing exercises. This is to prevent a lung infection.    · Most people stay in the hospital for 1-3 days after this surgery.       This information is not intended to replace advice given to you by your health care provider. Make sure you discuss any questions you have with your health care  provider.     Document Released: 06/09/2003 Document Revised: 12/23/2014 Document Reviewed: 06/19/2014  [employer] Patient Education ©2016 [employer]

## 2018-01-11 NOTE — DISCHARGE SUMMARY
HOSPITAL COURSE:  The patient underwent uneventful C4-C5 ACDF on January 10th.    She had immediate resolution of symptoms.  Following the operation she did   well.  She was observed overnight.  Her drain was discontinued.  Her pain was   well controlled on her oral regimen.  Her vitals were within normal limits and   she had no complications.    Postoperatively, the patient is to maintain a collar for 2 weeks.  She will   see me in the office in 2 weeks with additional x-rays at which time we will   more than likely remove her collar completely.  All questions were answered.    The patient has my cell phone number if she has any further questions.       ____________________________________     MD DREA Castaneda / GERSON    DD:  01/11/2018 08:03:28  DT:  01/11/2018 08:15:23    D#:  [id number]  Job#:  [id number]

## 2018-01-11 NOTE — CARE PLAN
Problem: Communication  Goal: The ability to communicate needs accurately and effectively will improve  Outcome: PROGRESSING AS EXPECTED  Pt calls for help appropriately on call light. Call light with in reach. Hourly rounding in place.        Problem: Pain Management  Goal: Pain level will decrease to patient's comfort goal  Outcome: PROGRESSING AS EXPECTED  Pt reports 2/10 pain.

## 2018-01-11 NOTE — PROGRESS NOTES
Assumed care of pt [id number]. Pt resting in bed with family at bedside. Pt ambulating often to the restroom.  Reviewed POC including safety, mobility, pain management, medication administration, DVT prophylaxis, and discharge. Pt anxious about  c-collar placement. Traction reaffirmed collar placement. Call light within reach. Pt calls appropriately. Hourly rounding in place. Pt has no needs or concerns at this time.

## 2018-01-11 NOTE — THERAPY
"Occupational Therapy Evaluation completed.   Functional Status:  Supervision supine to sit.  Pt donned underwear and pants supervised, following spinal precautions.  Pt donned button up shirt supervised.  Pt completed toilet transfer and toileting independently.  Pt brushed her teeth standing at sink supervised.  Pt was educated on c-collar management and donned collar at mirror with SBA.  Plan of Care: Patient with no further skilled OT needs in the acute care setting at this time  Discharge Recommendations:  Equipment: No Equipment Needed.     See \"Rehab Therapy-Acute\" Patient Summary Report for complete documentation.    "

## 2018-01-11 NOTE — PROGRESS NOTES
DATE OF SERVICE:  01/11/2018    I had a discussion with the patient and her nurse and this morning, she is   doing great.  She has absolutely no problem.  She notices immediate release of   that left upper extremity pain she was having.  She has no gross neurologic   deficits.  Her drain has been 20 mL.    ASSESSMENT AND PLAN:  Patient can have her drain discontinued, and we can llet her   go home today.  All questions were answered.  She will see me in 2 weeks.       ____________________________________     MD DREA Castaneda / GERSON    DD:  01/11/2018 08:02:37  DT:  01/11/2018 08:10:12    D#:  [id number]  Job#:  [id number]

## 2018-05-08 ENCOUNTER — OFFICE VISIT (OUTPATIENT)
Dept: URGENT CARE | Facility: CLINIC | Age: 48
End: 2018-05-08
Payer: COMMERCIAL

## 2018-05-08 VITALS
WEIGHT: 122 LBS | RESPIRATION RATE: 16 BRPM | OXYGEN SATURATION: 97 % | HEART RATE: 78 BPM | HEIGHT: 62 IN | SYSTOLIC BLOOD PRESSURE: 130 MMHG | BODY MASS INDEX: 22.45 KG/M2 | DIASTOLIC BLOOD PRESSURE: 80 MMHG | TEMPERATURE: 98.2 F
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
## 2018-05-08 PROCEDURE — 99213 OFFICE O/P EST LOW 20 MIN: CPT | Performed by: FAMILY MEDICINE

## 2018-05-08 RX ORDER — DOXYCYCLINE HYCLATE 100 MG
100 TABLET ORAL 2 TIMES DAILY
Qty: 20 TAB | Refills: 0 | Status: SHIPPED | OUTPATIENT
Start: 2018-05-08 | End: 2018-05-18

## 2018-05-08 NOTE — PATIENT INSTRUCTIONS
Tick Bite Information, Adult  Ticks are insects that draw blood for food. Most ticks live in shrubs and grassy areas. They climb onto people and animals that brush against the leaves and grasses that they rest on. Then they bite, attaching themselves to the skin.  Most ticks are harmless, but some ticks carry germs that can spread to a person through a bite and cause a disease. To reduce your risk of getting a disease from a tick bite, it is important to take steps to prevent tick bites. It is also important to check for ticks after being outdoors. If you find that a tick has attached to you, watch for symptoms of disease.  How can I prevent tick bites?  Take these steps to help prevent tick bites when you are outdoors in an area where ticks are found:  · Use insect repellent that has DEET (20% or higher), picaridin, or [id number] in it. Use it on:  ¨ Skin that is showing.  ¨ The top of your boots.  ¨ Your pant legs.  ¨ Your sleeve cuffs.  · For repellent products that contain permethrin, follow product instructions. Use these products on:  ¨ Clothing.  ¨ Gear.  ¨ Boots.  ¨ Tents.  · Wear protective clothing. Long sleeves and long pants offer the best protection from ticks.  · Wear light-colored clothing so you can see ticks more easily.  · Tuck your pant legs into your socks.  · If you go walking on a trail, stay in the middle of the trail so your skin, hair, and clothing do not touch the bushes.  · Avoid walking through areas with long grass.  · Check for ticks on your clothing, hair, and skin often while you are outside, and check again before you go inside. Make sure to check the places that ticks attach themselves most often. These places include the scalp, neck, armpits, waist, groin, and joint areas. Ticks that carry a disease called Lyme disease have to be attached to the skin for 24-48 hours. Checking for ticks every day will lessen your risk of this and other diseases.  · When you come indoors, wash your  clothes and take a shower or a bath right away. Dry your clothes in a dryer on high heat for at least 60 minutes. This will kill any ticks in your clothes.  What is the proper way to remove a tick?  If you find a tick on your body, remove it as soon as possible. Removing a tick sooner rather than later can prevent germs from passing from the tick to your body. To remove a tick that is crawling on your skin but has not bitten:  · Go outdoors and brush the tick off.  · Remove the tick with tape or a lint roller.  To remove a tick that is attached to your skin:  · Wash your hands.  · If you have latex gloves, put them on.  · Use tweezers, curved forceps, or a tick-removal tool to gently grasp the tick as close to your skin and the tick's head as possible.  · Gently pull with steady, upward pressure until the tick lets go. When removing the tick:  ¨ Take care to keep the tick's head attached to its body.  ¨ Do not twist or jerk the tick. This can make the tick's head or mouth break off.  ¨ Do not squeeze or crush the tick's body. This could force disease-carrying fluids from the tick into your body.  Do not try to remove a tick with heat, alcohol, petroleum jelly, or fingernail polish. Using these methods can cause the tick to salivate and regurgitate into your bloodstream, increasing your risk of getting a disease.  What should I do after removing a tick?  · Clean the bite area with soap and water, rubbing alcohol, or an iodine scrub.  · If an antiseptic cream or ointment is available, apply a small amount to the bite site.  · Wash and disinfect any instruments that you used to remove the tick.  How should I dispose of a tick?  To dispose of a live tick, use one of these methods:  · Place it in rubbing alcohol.  · Place it in a sealed bag or container.  · Wrap it tightly in tape.  · Flush it down the toilet.  Contact a health care provider if:  · You have symptoms of a disease after a tick bite. Symptoms of a  "tick-borne disease can occur from moments after the tick bites to up to 30 days after a tick is removed. Symptoms include:  ¨ Muscle, joint, or bone pain.  ¨ Difficulty walking or moving your legs.  ¨ Numbness in the legs.  ¨ Paralysis.  ¨ Red rash around the tick bite area that is shaped like a target or a \"bull's-eye.\"  ¨ Redness and swelling in the area of the tick bite.  ¨ Fever.  ¨ Repeated vomiting.  ¨ Diarrhea.  ¨ Weight loss.  ¨ Tender, swollen lymph glands.  ¨ Shortness of breath.  ¨ Cough.  ¨ Pain in the abdomen.  ¨ Headache.  ¨ Abnormal tiredness.  ¨ A change in your level of consciousness.  ¨ Confusion.  Get help right away if:  · You are not able to remove a tick.  · A part of a tick breaks off and gets stuck in your skin.  · Your symptoms get worse.  Summary  · Ticks may carry germs that can spread to a person through a bite and cause disease.  · Wear protective clothing and use insect repellent to prevent tick bites. Follow product instructions.  · If you find a tick on your body, remove it as soon as possible. If the tick is attached, do not try to remove with heat, alcohol, petroleum jelly, or fingernail polish.  · Remove the attached tick using tweezers, curved forceps, or a tick-removal tool. Gently pull with steady, upward pressure until the tick lets go. Do not twist or jerk the tick. Do not squeeze or crush the tick's body.  · If you have symptoms after being bitten by a tick, contact a health care provider.  This information is not intended to replace advice given to you by your health care provider. Make sure you discuss any questions you have with your health care provider.  Document Released: 12/15/2001 Document Revised: 09/29/2017 Document Reviewed: 09/29/2017  Elsevier Interactive Patient Education © 2017 [employer]    "

## 2018-05-29 ASSESSMENT — ENCOUNTER SYMPTOMS
COUGH: 0
VOMITING: 0
FEVER: 0

## 2018-05-29 NOTE — PROGRESS NOTES
"Subjective:   Madison Cardona is a 47 y.o. female who presents for Tick Removal (back of neck, x 4 days)        Tick Removal   The incident occurred 3 to 5 days ago. The foreign body is an insect. The incident was witnessed. The incident was witnessed/reported by the patient. Pertinent negatives include no cough, fever or vomiting.     Review of Systems   Constitutional: Negative for fever.   Respiratory: Negative for cough.    Gastrointestinal: Negative for vomiting.     No Known Allergies   Objective:   /80   Pulse 78   Temp 36.8 °C (98.2 °F)   Resp 16   Ht 1.575 m (5' 2\")   Wt 55.3 kg (122 lb)   SpO2 97%   BMI 22.31 kg/m²   Physical Exam   Constitutional: She is oriented to person, place, and time. She appears well-developed and well-nourished. No distress.   HENT:   Head: Normocephalic and atraumatic.   Eyes: Conjunctivae and EOM are normal. Pupils are equal, round, and reactive to light.   Cardiovascular: Normal rate and regular rhythm.    No murmur heard.  Pulmonary/Chest: Effort normal and breath sounds normal. No respiratory distress.   Abdominal: Soft. She exhibits no distension. There is no tenderness.   Neurological: She is alert and oriented to person, place, and time. She has normal reflexes. No sensory deficit.   Skin: Skin is warm and dry.        Psychiatric: She has a normal mood and affect.         Assessment/Plan:   Assessment    1. Tick bite, initial encounter  - doxycycline (VIBRAMYCIN) 100 MG Tab; Take 1 Tab by mouth 2 times a day for 10 days.  Dispense: 20 Tab; Refill: 0  Tick head removed with 18G needle without complication. Wound cleansed and polysporin and a sterile dressing applied.  Differential diagnosis, natural history, supportive care, and indications for immediate follow-up discussed.      "

## 2018-07-18 ENCOUNTER — HOSPITAL ENCOUNTER (OUTPATIENT)
Dept: RADIOLOGY | Facility: MEDICAL CENTER | Age: 48
End: 2018-07-18
Attending: SPECIALIST
Payer: COMMERCIAL

## 2018-07-18 DIAGNOSIS — Z12.31 VISIT FOR SCREENING MAMMOGRAM: ICD-10-CM

## 2018-07-18 PROCEDURE — 77067 SCR MAMMO BI INCL CAD: CPT

## 2020-02-09 ENCOUNTER — OFFICE VISIT (OUTPATIENT)
Dept: URGENT CARE | Facility: PHYSICIAN GROUP | Age: 50
End: 2020-02-09
Payer: COMMERCIAL

## 2020-02-09 VITALS
TEMPERATURE: 98.2 F | WEIGHT: 128.38 LBS | BODY MASS INDEX: 23.62 KG/M2 | HEART RATE: 83 BPM | DIASTOLIC BLOOD PRESSURE: 82 MMHG | HEIGHT: 62 IN | OXYGEN SATURATION: 96 % | SYSTOLIC BLOOD PRESSURE: 130 MMHG

## 2020-02-09 DIAGNOSIS — J32.9 RHINOSINUSITIS: ICD-10-CM

## 2020-02-09 DIAGNOSIS — J02.0 STREP THROAT: ICD-10-CM

## 2020-02-09 LAB
INT CON NEG: NORMAL
INT CON POS: NORMAL
S PYO AG THROAT QL: NORMAL

## 2020-02-09 PROCEDURE — 99214 OFFICE O/P EST MOD 30 MIN: CPT | Performed by: PHYSICIAN ASSISTANT

## 2020-02-09 PROCEDURE — 87880 STREP A ASSAY W/OPTIC: CPT | Performed by: PHYSICIAN ASSISTANT

## 2020-02-09 RX ORDER — AMOXICILLIN 500 MG/1
500 CAPSULE ORAL 3 TIMES DAILY
Qty: 30 CAP | Refills: 0 | Status: SHIPPED | OUTPATIENT
Start: 2020-02-09 | End: 2020-02-19

## 2020-02-09 RX ORDER — FLUTICASONE PROPIONATE 50 MCG
1 SPRAY, SUSPENSION (ML) NASAL DAILY
Qty: 16 G | Refills: 0 | Status: SHIPPED | OUTPATIENT
Start: 2020-02-09 | End: 2021-03-02

## 2020-02-09 ASSESSMENT — ENCOUNTER SYMPTOMS
COUGH: 1
HEADACHES: 1
SWOLLEN GLANDS: 1
STRIDOR: 0
SHORTNESS OF BREATH: 0

## 2020-02-09 NOTE — PROGRESS NOTES
Subjective:   Madison Cardona is a 49 y.o. female who presents for Pharyngitis (Left. x2day); Otalgia (Left x2day); and Cough (x3day)        Pharyngitis    This is a new problem. The current episode started in the past 7 days. The problem has been gradually worsening. The pain is worse on the left side. There has been no fever. The pain is moderate. Associated symptoms include coughing (dry), ear pain, headaches, a plugged ear sensation and swollen glands. Pertinent negatives include no congestion, shortness of breath or stridor. Associated symptoms comments: Pain with swallowing. She has had no exposure to strep or mono. Treatments tried: zinc. The treatment provided mild relief.     Review of Systems   HENT: Positive for ear pain. Negative for congestion.    Respiratory: Positive for cough (dry). Negative for shortness of breath and stridor.    Neurological: Positive for headaches.       PMH:  has a past medical history of Dental disorder and Pain (01/05/2018).  MEDS:   Current Outpatient Medications:   •  amoxicillin (AMOXIL) 500 MG Cap, Take 1 Cap by mouth 3 times a day for 10 days., Disp: 30 Cap, Rfl: 0  •  fluticasone (FLONASE) 50 MCG/ACT nasal spray, Spray 1 Spray in nose every day., Disp: 16 g, Rfl: 0  ALLERGIES: No Known Allergies  SURGHX:   Past Surgical History:   Procedure Laterality Date   • CERVICAL DISK AND FUSION ANTERIOR  1/10/2018    Procedure: CERVICAL DISK AND FUSION ANTERIOR/C4-5;  Surgeon: Elvis De Leon M.D.;  Location: SURGERY [facility];  Service: Orthopedics   • COLOSTOMY CLOSURE  1971   • GYN SURGERY      hysterectomy   • OTHER      DENTAL IMPLANTs   • OTHER ABDOMINAL SURGERY  1970/71    colostomy/imperforate anus     SOCHX:  reports that she has never smoked. She has never used smokeless tobacco. She reports current alcohol use of about 1.2 oz of alcohol per week. She reports that she does not use drugs.  FH: Family history was reviewed, no pertinent findings to report    "Objective:   /82 (BP Location: Left arm, Patient Position: Sitting, BP Cuff Size: Adult)   Pulse 83   Temp 36.8 °C (98.2 °F) (Temporal)   Ht 1.575 m (5' 2\")   Wt 58.2 kg (128 lb 6 oz)   SpO2 96%   BMI 23.48 kg/m²   Physical Exam  Vitals signs reviewed.   Constitutional:       General: She is not in acute distress.     Appearance: Normal appearance. She is well-developed. She is not toxic-appearing.   HENT:      Head: Normocephalic and atraumatic.      Right Ear: Tympanic membrane, ear canal and external ear normal.      Left Ear: Tympanic membrane, ear canal and external ear normal.      Nose: Mucosal edema and rhinorrhea present. No congestion. Rhinorrhea is clear.      Mouth/Throat:      Lips: Pink.      Mouth: Mucous membranes are moist.      Pharynx: Oropharynx is clear. Uvula midline. Posterior oropharyngeal erythema present.   Eyes:      General: Lids are normal.      Conjunctiva/sclera: Conjunctivae normal.   Neck:      Musculoskeletal: Neck supple.   Cardiovascular:      Rate and Rhythm: Normal rate and regular rhythm.      Heart sounds: Normal heart sounds, S1 normal and S2 normal. No murmur. No friction rub. No gallop.    Pulmonary:      Effort: Pulmonary effort is normal. No respiratory distress.      Breath sounds: Normal breath sounds. No decreased breath sounds, wheezing, rhonchi or rales.   Musculoskeletal:      Comments: Normal range of motion. Exhibits no edema and no tenderness.    Lymphadenopathy:      Cervical: Cervical adenopathy present.      Right cervical: Superficial cervical adenopathy present. No posterior cervical adenopathy.     Left cervical: Superficial cervical adenopathy present. No posterior cervical adenopathy.   Skin:     General: Skin is warm and dry.      Capillary Refill: Capillary refill takes less than 2 seconds.   Neurological:      Mental Status: She is alert and oriented to person, place, and time.      Cranial Nerves: No cranial nerve deficit.      Sensory: No " sensory deficit.   Psychiatric:         Speech: Speech normal.         Behavior: Behavior normal.         Thought Content: Thought content normal.         Judgment: Judgment normal.           Assessment/Plan:   1. Strep throat  - amoxicillin (AMOXIL) 500 MG Cap; Take 1 Cap by mouth 3 times a day for 10 days.  Dispense: 30 Cap; Refill: 0    2. Rhinosinusitis  - fluticasone (FLONASE) 50 MCG/ACT nasal spray; Spray 1 Spray in nose every day.  Dispense: 16 g; Refill: 0    1. Pos strep. We will tx with abx.    Pt instructed to complete full course of medication despite symptomatic improvement. Pt to take med with meals to alleviate GI upset. Pt to take a probiotic or eat Nataliia’s yogurt/ kefir while taking the medication.    You are contagious for 24hrs after starting abx.  Good hand hygiene and not sharing food or drinks. Change toothbrush in 48 hours. Salt water gurgles for sore throat and lozenges.    Follow up with PCP as needed. May take tylenol or motrin for discomfort as directed.     2. Flonase 1 squirt in each nostril, as instructed in clinic, once a day.  Use nasal saline TID to promote drainage.   Salt water gurgles to soothe sore throat.  Ayr saline gel to moisturize nasal passage and prevent bleeding.  Try to use sudafed sparingly in order to allow sinuses to drain.  Avoid the longer formulations and try to take only in the morning and/or at noon if needed.  If you fail to improve in 3-5 days or symptoms worsen/new symptoms develop, RTC for reevaluation    Differential diagnosis, natural history, supportive care, and indications for immediate follow-up discussed.

## 2020-10-20 ENCOUNTER — HOSPITAL ENCOUNTER (OUTPATIENT)
Dept: LAB | Facility: MEDICAL CENTER | Age: 50
End: 2020-10-20
Payer: COMMERCIAL

## 2020-10-20 LAB
COVID ORDER STATUS COVID19: NORMAL
SARS-COV-2 RNA RESP QL NAA+PROBE: NOTDETECTED
SPECIMEN SOURCE: NORMAL

## 2020-10-20 PROCEDURE — U0003 INFECTIOUS AGENT DETECTION BY NUCLEIC ACID (DNA OR RNA); SEVERE ACUTE RESPIRATORY SYNDROME CORONAVIRUS 2 (SARS-COV-2) (CORONAVIRUS DISEASE [COVID-19]), AMPLIFIED PROBE TECHNIQUE, MAKING USE OF HIGH THROUGHPUT TECHNOLOGIES AS DESCRIBED BY CMS-2020-01-R: HCPCS

## 2020-12-08 ENCOUNTER — HOSPITAL ENCOUNTER (OUTPATIENT)
Dept: RADIOLOGY | Facility: MEDICAL CENTER | Age: 50
End: 2020-12-08
Attending: INTERNAL MEDICINE
Payer: COMMERCIAL

## 2020-12-08 DIAGNOSIS — Z12.31 VISIT FOR SCREENING MAMMOGRAM: ICD-10-CM

## 2020-12-08 PROCEDURE — 77067 SCR MAMMO BI INCL CAD: CPT

## 2021-01-20 ENCOUNTER — OFFICE VISIT (OUTPATIENT)
Dept: MEDICAL GROUP | Facility: PHYSICIAN GROUP | Age: 51
End: 2021-01-20
Payer: COMMERCIAL

## 2021-01-20 ENCOUNTER — HOSPITAL ENCOUNTER (OUTPATIENT)
Dept: LAB | Facility: MEDICAL CENTER | Age: 51
End: 2021-01-20
Attending: INTERNAL MEDICINE
Payer: COMMERCIAL

## 2021-01-20 VITALS
OXYGEN SATURATION: 99 % | WEIGHT: 131 LBS | RESPIRATION RATE: 12 BRPM | HEART RATE: 84 BPM | HEIGHT: 62 IN | DIASTOLIC BLOOD PRESSURE: 70 MMHG | TEMPERATURE: 98.6 F | SYSTOLIC BLOOD PRESSURE: 116 MMHG | BODY MASS INDEX: 24.11 KG/M2

## 2021-01-20 DIAGNOSIS — Z00.00 WELLNESS EXAMINATION: ICD-10-CM

## 2021-01-20 DIAGNOSIS — F10.10 EXCESSIVE DRINKING OF ALCOHOL: ICD-10-CM

## 2021-01-20 DIAGNOSIS — M48.02 SPINAL STENOSIS IN CERVICAL REGION: ICD-10-CM

## 2021-01-20 DIAGNOSIS — Z98.890 HISTORY OF GASTROINTESTINAL SURGERY: ICD-10-CM

## 2021-01-20 DIAGNOSIS — Z12.11 COLON CANCER SCREENING: ICD-10-CM

## 2021-01-20 DIAGNOSIS — Z90.710 H/O: HYSTERECTOMY: ICD-10-CM

## 2021-01-20 DIAGNOSIS — Z23 NEED FOR VACCINATION: ICD-10-CM

## 2021-01-20 PROBLEM — R10.9 ABDOMINAL PAIN: Status: ACTIVE | Noted: 2021-01-20

## 2021-01-20 LAB
ALBUMIN SERPL BCP-MCNC: 5.4 G/DL (ref 3.2–4.9)
ALBUMIN/GLOB SERPL: 1.7 G/DL
ALP SERPL-CCNC: 39 U/L (ref 30–99)
ALT SERPL-CCNC: 35 U/L (ref 2–50)
ANION GAP SERPL CALC-SCNC: 16 MMOL/L (ref 7–16)
AST SERPL-CCNC: 37 U/L (ref 12–45)
BASOPHILS # BLD AUTO: 0.5 % (ref 0–1.8)
BASOPHILS # BLD: 0.02 K/UL (ref 0–0.12)
BILIRUB SERPL-MCNC: 0.8 MG/DL (ref 0.1–1.5)
BUN SERPL-MCNC: 10 MG/DL (ref 8–22)
CALCIUM SERPL-MCNC: 10.4 MG/DL (ref 8.5–10.5)
CHLORIDE SERPL-SCNC: 97 MMOL/L (ref 96–112)
CHOLEST SERPL-MCNC: 274 MG/DL (ref 100–199)
CO2 SERPL-SCNC: 25 MMOL/L (ref 20–33)
CREAT SERPL-MCNC: 0.67 MG/DL (ref 0.5–1.4)
EOSINOPHIL # BLD AUTO: 0.05 K/UL (ref 0–0.51)
EOSINOPHIL NFR BLD: 1.2 % (ref 0–6.9)
ERYTHROCYTE [DISTWIDTH] IN BLOOD BY AUTOMATED COUNT: 47.8 FL (ref 35.9–50)
FASTING STATUS PATIENT QL REPORTED: NORMAL
GLOBULIN SER CALC-MCNC: 3.1 G/DL (ref 1.9–3.5)
GLUCOSE SERPL-MCNC: 90 MG/DL (ref 65–99)
HCT VFR BLD AUTO: 50.3 % (ref 37–47)
HDLC SERPL-MCNC: 117 MG/DL
HGB BLD-MCNC: 16.1 G/DL (ref 12–16)
IMM GRANULOCYTES # BLD AUTO: 0.01 K/UL (ref 0–0.11)
IMM GRANULOCYTES NFR BLD AUTO: 0.2 % (ref 0–0.9)
LDLC SERPL CALC-MCNC: 144 MG/DL
LYMPHOCYTES # BLD AUTO: 1.52 K/UL (ref 1–4.8)
LYMPHOCYTES NFR BLD: 36.8 % (ref 22–41)
MCH RBC QN AUTO: 31.1 PG (ref 27–33)
MCHC RBC AUTO-ENTMCNC: 32 G/DL (ref 33.6–35)
MCV RBC AUTO: 97.1 FL (ref 81.4–97.8)
MONOCYTES # BLD AUTO: 0.29 K/UL (ref 0–0.85)
MONOCYTES NFR BLD AUTO: 7 % (ref 0–13.4)
NEUTROPHILS # BLD AUTO: 2.24 K/UL (ref 2–7.15)
NEUTROPHILS NFR BLD: 54.3 % (ref 44–72)
NRBC # BLD AUTO: 0 K/UL
NRBC BLD-RTO: 0 /100 WBC
PLATELET # BLD AUTO: 93 K/UL (ref 164–446)
PMV BLD AUTO: 10.2 FL (ref 9–12.9)
POTASSIUM SERPL-SCNC: 3.9 MMOL/L (ref 3.6–5.5)
PROT SERPL-MCNC: 8.5 G/DL (ref 6–8.2)
RBC # BLD AUTO: 5.18 M/UL (ref 4.2–5.4)
SODIUM SERPL-SCNC: 138 MMOL/L (ref 135–145)
TRIGL SERPL-MCNC: 63 MG/DL (ref 0–149)
TSH SERPL DL<=0.005 MIU/L-ACNC: 2.45 UIU/ML (ref 0.38–5.33)
WBC # BLD AUTO: 4.1 K/UL (ref 4.8–10.8)

## 2021-01-20 PROCEDURE — 85025 COMPLETE CBC W/AUTO DIFF WBC: CPT

## 2021-01-20 PROCEDURE — 99204 OFFICE O/P NEW MOD 45 MIN: CPT | Mod: 25 | Performed by: INTERNAL MEDICINE

## 2021-01-20 PROCEDURE — 80061 LIPID PANEL: CPT

## 2021-01-20 PROCEDURE — 80053 COMPREHEN METABOLIC PANEL: CPT

## 2021-01-20 PROCEDURE — 84443 ASSAY THYROID STIM HORMONE: CPT

## 2021-01-20 PROCEDURE — 90750 HZV VACC RECOMBINANT IM: CPT | Performed by: INTERNAL MEDICINE

## 2021-01-20 PROCEDURE — 90471 IMMUNIZATION ADMIN: CPT | Performed by: INTERNAL MEDICINE

## 2021-01-20 PROCEDURE — 36415 COLL VENOUS BLD VENIPUNCTURE: CPT

## 2021-01-20 RX ORDER — OMEPRAZOLE 20 MG/1
20 TABLET, DELAYED RELEASE ORAL 2 TIMES DAILY
Qty: 60 TAB | Refills: 3 | Status: SHIPPED
Start: 2021-01-20 | End: 2021-03-02

## 2021-01-20 ASSESSMENT — PATIENT HEALTH QUESTIONNAIRE - PHQ9: CLINICAL INTERPRETATION OF PHQ2 SCORE: 0

## 2021-01-20 NOTE — ASSESSMENT & PLAN NOTE
The patient states she had a C4-C5 ACDF (anterior cervical discectomy and fusion) procedure in 2017 which resolved her chronic left shoulder pain.

## 2021-01-20 NOTE — PROGRESS NOTES
Subjective:     CC: Establish care    HPI:   Madison presents today to establish care and discuss the following issues:    Spinal stenosis in cervical region  The patient states she had a C4-C5 ACDF (anterior cervical discectomy and fusion) procedure in 2017 which resolved her chronic left shoulder pain.      H/O: hysterectomy  The patient states she had a hysterectomy in 2012.  She does not know if the cervix has been removed.    Abdominal pain  This is an acute problem.  She reports a constant epigastric pain and gurgling.  She has had this symptom for approximately 1 year.  She has not tried any remedies for the pain.  She denies any nausea or vomiting.     Excessive drinking of alcohol  This is an acute problem.  The patient feels she has been drinking excessively.  She states she drinks approximately 3-4 alcoholic beverages per night, most commonly wine but does consume bourbon as well.  She states that she is a  and has been under a great deal of stress during the Covid pandemic.  She feels that she is using it as an escape mechanism, rather than having chronic anxiety or depression as the underlying reason for her drinking.  She reports a good relationship with her  and children.  Her  is not an excessive drinker.  She is concerned because her father was a heroin addict and alcoholic.  She feels very disappointed in herself and is determined to stop drinking.    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  She frequently alternates between constipation and diarrhea, but this is stable for her.  She states she also sometimes has fecal incontinence during her diarrhea episodes.      Past Medical History:   Diagnosis Date   • Dental disorder     has some dental implants   • Pain 01/05/2018    scapula to left arm       Social History     Tobacco Use   • Smoking status: Never Smoker   •  "Smokeless tobacco: Never Used   Substance Use Topics   • Alcohol use: Yes     Alcohol/week: 1.2 oz     Types: 2 Glasses of wine per week     Comment: 5 per week    • Drug use: No       Current Outpatient Medications Ordered in Epic   Medication Sig Dispense Refill   • omeprazole (PRILOSEC OTC) 20 MG tablet Take 1 Tab by mouth 2 times a day. 60 Tab 3   • fluticasone (FLONASE) 50 MCG/ACT nasal spray Spray 1 Spray in nose every day. (Patient not taking: Reported on 1/20/2021) 16 g 0     No current Epic-ordered facility-administered medications on file.        Allergies:  Patient has no known allergies.    Health Maintenance: Completed    ROS:  Gen: no fevers/chills, no changes in weight  Eyes: no changes in vision  ENT: no sore throat, no hearing loss, no bloody nose  Pulm: no sob, no cough  CV: no chest pain, no palpitations  GI: no nausea/vomiting, no diarrhea  : no dysuria  MSk: no myalgias  Skin: no rash  Neuro: no headaches, no numbness/tingling  Heme/Lymph: no easy bruising      Objective:       Exam:  /70 (BP Location: Right arm, Patient Position: Sitting, BP Cuff Size: Adult)   Pulse 84   Temp 37 °C (98.6 °F)   Resp 12   Ht 1.575 m (5' 2\")   Wt 59.4 kg (131 lb)   SpO2 99%   BMI 23.96 kg/m²  Body mass index is 23.96 kg/m².    Gen: Alert and oriented, No apparent distress.  Neck: Neck is supple without lymphadenopathy.  No thyromegaly  Lungs: Normal effort, CTA bilaterally, no wheezes, rhonchi, or rales  CV: Regular rate and rhythm. No murmurs, rubs, or gallops.  Abd: Mild epigastric tenderness to palpation  Ext: No clubbing, cyanosis, edema.      Assessment & Plan:     50 y.o. female with the following -     1. Spinal stenosis in cervical region  This is a chronic condition.  Patient is status post C4-C5 ACDF procedure in 2017.     2. Excessive drinking of alcohol  This is a new problem.  She reports drinking 3-4 alcoholic beverages nightly.  Feels work related stress is contributing to her " alcohol consumption.  She expressed a strong desire to stop her alcohol consumption.  - REFERRAL TO BEHAVIORAL HEALTH  - CBC WITH DIFFERENTIAL; Future  - Comp Metabolic Panel; Future    3. Abdominal pain  This is a new problem.  The patient reports epigastric pain over the last year.  Patient likely has gastritis due to excessive alcohol consumption.  -Start omeprazole 20 mg twice daily    4. Wellness examination  - CBC WITH DIFFERENTIAL; Future  - Lipid Profile; Future  - TSH WITH REFLEX TO FT4; Future  - Comp Metabolic Panel; Future    4. Colon cancer screening  - REFERRAL TO GI FOR COLONOSCOPY    5. Need for vaccination  - Shingles Vaccine (Shingrix)      Return in about 4 weeks (around 2/17/2021) for f/u labs.    Please note that this dictation was created using voice recognition software. I have made every reasonable attempt to correct obvious errors, but I expect that there are errors of grammar and possibly content that I did not discover before finalizing the note.

## 2021-01-20 NOTE — ASSESSMENT & PLAN NOTE
This is an acute problem.  The patient feels she has been drinking excessively.  She states she drinks approximately 3-4 alcoholic beverages per night, most commonly wine but does consume bourbon as well.  She states that she is a  and has been under a great deal of stress during the Covid pandemic.  She feels that she is using it as an escape mechanism, rather than having chronic anxiety or depression as the underlying reason for her drinking.  She reports a good relationship with her  and children.  Her  is not an excessive drinker.  She is concerned because her father was a heroin addict and alcoholic.  She feels very disappointed in herself and is determined to stop drinking.

## 2021-01-20 NOTE — ASSESSMENT & PLAN NOTE
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  She frequently alternates between constipation and diarrhea, but this is stable for her.  She states she also sometimes has fecal incontinence during her diarrhea episodes.

## 2021-01-20 NOTE — ASSESSMENT & PLAN NOTE
The patient states she had a hysterectomy in 2012.  She does not know if the cervix has been removed.

## 2021-01-20 NOTE — ASSESSMENT & PLAN NOTE
This is an acute problem.  She reports a constant epigastric pain and gurgling.  She has had this symptom for approximately 1 year.  She has not tried any remedies for the pain.  She denies any nausea or vomiting.

## 2021-01-21 DIAGNOSIS — R77.9 ELEVATED BLOOD PROTEIN: ICD-10-CM

## 2021-01-21 DIAGNOSIS — D69.6 THROMBOCYTOPENIA (HCC): ICD-10-CM

## 2021-03-02 ENCOUNTER — OFFICE VISIT (OUTPATIENT)
Dept: MEDICAL GROUP | Facility: PHYSICIAN GROUP | Age: 51
End: 2021-03-02
Payer: COMMERCIAL

## 2021-03-02 VITALS
OXYGEN SATURATION: 98 % | HEIGHT: 62 IN | DIASTOLIC BLOOD PRESSURE: 80 MMHG | BODY MASS INDEX: 23.92 KG/M2 | HEART RATE: 80 BPM | SYSTOLIC BLOOD PRESSURE: 110 MMHG | TEMPERATURE: 97.3 F | RESPIRATION RATE: 12 BRPM | WEIGHT: 130 LBS

## 2021-03-02 DIAGNOSIS — R77.8 ELEVATED TOTAL PROTEIN: ICD-10-CM

## 2021-03-02 DIAGNOSIS — D58.2 ELEVATED HEMOGLOBIN (HCC): ICD-10-CM

## 2021-03-02 DIAGNOSIS — R10.13 EPIGASTRIC PAIN: ICD-10-CM

## 2021-03-02 DIAGNOSIS — F10.10 EXCESSIVE DRINKING OF ALCOHOL: ICD-10-CM

## 2021-03-02 DIAGNOSIS — D72.819 LEUKOPENIA, UNSPECIFIED TYPE: ICD-10-CM

## 2021-03-02 DIAGNOSIS — J02.9 SORE THROAT: ICD-10-CM

## 2021-03-02 DIAGNOSIS — E78.2 MIXED HYPERLIPIDEMIA: ICD-10-CM

## 2021-03-02 DIAGNOSIS — D69.6 THROMBOCYTOPENIA (HCC): ICD-10-CM

## 2021-03-02 PROBLEM — E78.5 HYPERLIPIDEMIA: Status: ACTIVE | Noted: 2021-03-02

## 2021-03-02 LAB
INT CON NEG: NEGATIVE
INT CON POS: POSITIVE
S PYO AG THROAT QL: NORMAL

## 2021-03-02 PROCEDURE — 87880 STREP A ASSAY W/OPTIC: CPT | Performed by: INTERNAL MEDICINE

## 2021-03-02 PROCEDURE — 99213 OFFICE O/P EST LOW 20 MIN: CPT | Performed by: INTERNAL MEDICINE

## 2021-03-02 ASSESSMENT — FIBROSIS 4 INDEX: FIB4 SCORE: 3.36

## 2021-03-02 NOTE — PROGRESS NOTES
Subjective:     CC: Follow-up on labs    HPI:   Madison presents today to follow-up on labs and discuss her alcohol use.    Sore throat:  Daughter dx'd with strep. Feels like neck/ throat is maybe swollen. No fever, chills, sweats, cough.    Excessive drinking of alcohol  This is a chronic problem. Cut out hard liquor. Now down to 2 to 3 glasses of wine daily.  Has plans to further decrease her alcohol consumption.    Epigastric pain  This is a chronic problem. Improved. Not taking PPI as it gave her constipation.    Thrombocytopenia (HCC)  This is an acute problem.  Patient noted to have a platelet count of 93 on 1/20/2021.    Leukopenia   This is an acute problem.  Patient noted to have a low WBC of 4.1 on 1/20/2021.    Elevated hemoglobin (HCC)  This is an acute problem.  Patient noted to have an elevated hemoglobin and hematocrit of 16.1 and 50.3 on 1/20/2021.    Elevated total protein  This is an acute problem.  Patient noted to have an elevated total protein of 5.4 with an elevated globulin of 8.5 on 1/20/2021.    Mixed hyperlipidemia  This is an acute problem. Lipid panel from 1/20/2021 showed a total cholesterol 274, , , triglycerides 63. The ASCVD Risk score ([place] Jr, et al., 2013) failed to calculate.        Past Medical History:   Diagnosis Date   • Dental disorder     has some dental implants   • Pain 01/05/2018    scapula to left arm       Social History     Tobacco Use   • Smoking status: Never Smoker   • Smokeless tobacco: Never Used   Substance Use Topics   • Alcohol use: Yes     Alcohol/week: 1.2 oz     Types: 2 Glasses of wine per week     Comment: 5 per week    • Drug use: No       Current Outpatient Medications Ordered in Epic   Medication Sig Dispense Refill   • omeprazole (PRILOSEC OTC) 20 MG tablet Take 1 Tab by mouth 2 times a day. (Patient not taking: Reported on 3/2/2021) 60 Tab 3     No current Epic-ordered facility-administered medications on file.       Allergies:  Patient  "has no known allergies.    Health Maintenance: Completed    ROS:  Gen: no fevers/chills, no changes in weight  Eyes: no changes in vision  ENT: no sore throat, no hearing loss, no bloody nose  Pulm: no sob, no cough  CV: no chest pain, no palpitations  GI: no nausea/vomiting, no diarrhea  : no dysuria  MSk: no myalgias  Skin: no rash  Neuro: no headaches, no numbness/tingling  Heme/Lymph: no easy bruising      Objective:       Exam:  /80   Pulse 80   Temp 36.3 °C (97.3 °F)   Resp 12   Ht 1.575 m (5' 2\")   Wt 59 kg (130 lb)   SpO2 98%   BMI 23.78 kg/m²  Body mass index is 23.78 kg/m².    Gen: Alert and oriented, No apparent distress.  HEENT: Oropharynx is without erythema or exudate.  Neck: No lymphadenopathy appreciated  Lungs: Normal effort, CTA bilaterally, no wheezes, rhonchi, or rales  CV: Regular rate and rhythm. No murmurs, rubs, or gallops.    Labs: Reviewed and discussed with patient    Assessment & Plan:     50 y.o. female with the following -     Sore throat:  Low suspicion for strep at this time.  Will however test given close contact.  - POCT Rapid Strep A    Excessive drinking of alcohol  This is a chronic problem.  The patient has made significant progress in attempting to diminish her alcohol intake. Has plans to further decrease her alcohol consumption.  We have scheduled a follow-up for 3 months to follow her for continued progress.    Epigastric pain  This is a chronic problem.  The patient's pain has improved with her decreased alcohol consumption.  She is unable to tolerate a PPI medication due to constipation.  -We will continue to monitor    Thrombocytopenia (HCC)  Elevated hemoglobin (HCC)  Leukopenia   Elevated total protein  This is an acute problem.  Patient has multiple lab abnormalities likely secondary to her excessive alcohol use.  She has cut down significantly on her alcohol consumption.  We will repeat the labs today.  If she continues to have significant abnormalities " will perform additional testing to rule out causes other than alcohol.  - CBC WITH DIFFERENTIAL; Future  - Comp Metabolic Panel; Future  - VITAMIN B12; Future  - FOLATE; Future    Mixed hyperlipidemia  This is an acute problem. Lipid panel from 1/20/2021 showed a total cholesterol 274, , , triglycerides 63. The ASCVD Risk score (Nancy TOLLIVER Jr, et al., 2013) failed to calculate.  Her ASCVD is unable to be calculated due to her elevated HDL.  She is also on a paleo diet which may be contributing to her elevated cholesterol, she has cut out red meat for this reason.  - Lipid Profile; Future      I spent a total of 20 minutes with record review, exam, communication with the patient, communication with other providers, and documentation of this encounter.      Return in about 3 months (around 6/2/2021) for f/u labs.    Please note that this dictation was created using voice recognition software. I have made every reasonable attempt to correct obvious errors, but I expect that there are errors of grammar and possibly content that I did not discover before finalizing the note.

## 2021-05-11 ENCOUNTER — OFFICE VISIT (OUTPATIENT)
Dept: MEDICAL GROUP | Facility: PHYSICIAN GROUP | Age: 51
End: 2021-05-11
Payer: COMMERCIAL

## 2021-05-11 VITALS
TEMPERATURE: 98 F | OXYGEN SATURATION: 100 % | HEIGHT: 62 IN | HEART RATE: 89 BPM | BODY MASS INDEX: 23.92 KG/M2 | RESPIRATION RATE: 12 BRPM | SYSTOLIC BLOOD PRESSURE: 110 MMHG | WEIGHT: 130 LBS | DIASTOLIC BLOOD PRESSURE: 62 MMHG

## 2021-05-11 DIAGNOSIS — Z87.898 HISTORY OF ALCOHOL USE DISORDER: ICD-10-CM

## 2021-05-11 DIAGNOSIS — Z84.81 FAMILY HISTORY OF BRCA GENE MUTATION: ICD-10-CM

## 2021-05-11 PROBLEM — F10.10 EXCESSIVE DRINKING OF ALCOHOL: Status: RESOLVED | Noted: 2021-01-20 | Resolved: 2021-05-11

## 2021-05-11 PROCEDURE — 99213 OFFICE O/P EST LOW 20 MIN: CPT | Performed by: INTERNAL MEDICINE

## 2021-05-11 ASSESSMENT — FIBROSIS 4 INDEX: FIB4 SCORE: 3.36

## 2021-05-11 NOTE — PROGRESS NOTES
"Subjective:     CC: Follow-up on alcohol use     HPI:   Madison presents today to to be tested for the BRCA gene mutation.  Her mother  of breast cancer at age 48.  She does not think she ever had BRCA testing performed.  Her sister was just diagnosed with stage IV ovarian cancer and found to be BRCA positive.    She had her colonoscopy done on 2021 and it was normal.    With regard to her alcohol usage, she is doing well.  She has limited her alcohol consumption to weekends only, and mostly wine.  Typically about 2 glasses per weekend.      Past Medical History:   Diagnosis Date   • Dental disorder     has some dental implants   • Pain 2018    scapula to left arm       Social History     Tobacco Use   • Smoking status: Never Smoker   • Smokeless tobacco: Never Used   Substance Use Topics   • Alcohol use: Yes     Alcohol/week: 1.2 oz     Types: 2 Glasses of wine per week     Comment: 5 per week    • Drug use: No       No current Epic-ordered outpatient medications on file.     No current Epic-ordered facility-administered medications on file.       Allergies:  Patient has no known allergies.      Objective:     Exam:  /62   Pulse 89   Temp 36.7 °C (98 °F)   Resp 12   Ht 1.575 m (5' 2\")   Wt 59 kg (130 lb)   SpO2 100%   BMI 23.78 kg/m²  Body mass index is 23.78 kg/m².    Gen: Alert and oriented, No apparent distress.    Assessment & Plan:     50 y.o. female with the following -     History of alcohol use disorder  This is a chronic problem.    Resolved.  The patient has made significant progress in attempting to diminish her alcohol intake.  Drinks approximately 2 glasses of wine on weekends only.    Family history of BRCA gene mutation  This is a chronic condition.  The patient's mother  of breast cancer at age 48, no known BRCA testing was performed.  The patient's sister was recently diagnosed with stage IV ovarian cancer and was found to be BRCA positive.  - REFERRAL TO GENETICS  - " US-PELVIC COMPLETE (TRANSABDOMINAL/TRANSVAGINAL) (COMBO); Future  - BRCA1 AND BRCA2)SEQUENCING AND DELETION/DUPLICATION; Future      I spent a total of 20 minutes with record review, exam, communication with the patient, communication with other providers, and documentation of this encounter.      Return in about 4 weeks (around 6/8/2021) for f/u labs.    Please note that this dictation was created using voice recognition software. I have made every reasonable attempt to correct obvious errors, but I expect that there are errors of grammar and possibly content that I did not discover before finalizing the note.

## 2021-05-26 ENCOUNTER — NON-PROVIDER VISIT (OUTPATIENT)
Dept: HEMATOLOGY ONCOLOGY | Facility: MEDICAL CENTER | Age: 51
End: 2021-05-26
Payer: COMMERCIAL

## 2021-05-26 NOTE — PROGRESS NOTES
Madison Cardona is a 50 y.o. female here for a non-provider visit for: Lab Draws  on 5/26/2021 at 1:20 PM    Procedure Performed: Venipuncture     Anatomical site: Left Antecubital Area (AC)    Equipment used: 25 butterfly    Labs drawn: Immedia (two lavender EDTA tubes)    Ordering Provider: [facility]    Eduard By:Lucie Hernandes

## 2021-06-21 ENCOUNTER — TELEPHONE (OUTPATIENT)
Dept: MEDICAL GROUP | Facility: PHYSICIAN GROUP | Age: 51
End: 2021-06-21

## 2021-06-21 NOTE — TELEPHONE ENCOUNTER
Pt called inquiring if she should do her Pelvic U/S first prior seeing Dr Sanchez.  Pt is scheduled with Dr Sanchez on Tuesday 6/22/21 @ 7:00am.  She suppose to see her PCP for follow up on her testing.  She hasn't done the Pelvic U/S yet.  She was advised get the U/S done and re-schedule her appointment so she can discuss her results with Dr Sanchez.    Pt requested to cancel her appoint for now.  She will contact our office after speaking with the imaging and after she finds out he appointment for Pelvic U/S.

## 2021-06-22 ENCOUNTER — APPOINTMENT (OUTPATIENT)
Dept: MEDICAL GROUP | Facility: PHYSICIAN GROUP | Age: 51
End: 2021-06-22
Payer: COMMERCIAL

## 2021-07-19 ENCOUNTER — HOSPITAL ENCOUNTER (OUTPATIENT)
Dept: RADIOLOGY | Facility: MEDICAL CENTER | Age: 51
End: 2021-07-19
Attending: INTERNAL MEDICINE
Payer: COMMERCIAL

## 2021-07-19 DIAGNOSIS — Z84.81 FAMILY HISTORY OF BRCA GENE MUTATION: ICD-10-CM

## 2021-07-19 PROCEDURE — 76830 TRANSVAGINAL US NON-OB: CPT

## 2021-12-09 ENCOUNTER — HOSPITAL ENCOUNTER (OUTPATIENT)
Dept: LAB | Facility: MEDICAL CENTER | Age: 51
End: 2021-12-09
Attending: PHYSICIAN ASSISTANT
Payer: COMMERCIAL

## 2021-12-09 LAB — CYTOLOGY REG CYTOL: NORMAL

## 2021-12-09 PROCEDURE — 88175 CYTOPATH C/V AUTO FLUID REDO: CPT

## 2022-02-28 ENCOUNTER — PRE-ADMISSION TESTING (OUTPATIENT)
Dept: ADMISSIONS | Facility: MEDICAL CENTER | Age: 52
End: 2022-02-28
Attending: OBSTETRICS & GYNECOLOGY
Payer: COMMERCIAL

## 2022-02-28 DIAGNOSIS — Z01.812 PRE-OPERATIVE LABORATORY EXAMINATION: ICD-10-CM

## 2022-02-28 LAB
ANION GAP SERPL CALC-SCNC: 13 MMOL/L (ref 7–16)
BUN SERPL-MCNC: 13 MG/DL (ref 8–22)
CALCIUM SERPL-MCNC: 9.9 MG/DL (ref 8.5–10.5)
CHLORIDE SERPL-SCNC: 102 MMOL/L (ref 96–112)
CO2 SERPL-SCNC: 24 MMOL/L (ref 20–33)
CREAT SERPL-MCNC: 0.88 MG/DL (ref 0.5–1.4)
ERYTHROCYTE [DISTWIDTH] IN BLOOD BY AUTOMATED COUNT: 43.8 FL (ref 35.9–50)
GLUCOSE SERPL-MCNC: 100 MG/DL (ref 65–99)
HCT VFR BLD AUTO: 45.8 % (ref 37–47)
HGB BLD-MCNC: 15.5 G/DL (ref 12–16)
MCH RBC QN AUTO: 31.5 PG (ref 27–33)
MCHC RBC AUTO-ENTMCNC: 33.8 G/DL (ref 33.6–35)
MCV RBC AUTO: 93.1 FL (ref 81.4–97.8)
PLATELET # BLD AUTO: 192 K/UL (ref 164–446)
PMV BLD AUTO: 9.8 FL (ref 9–12.9)
POTASSIUM SERPL-SCNC: 3.9 MMOL/L (ref 3.6–5.5)
RBC # BLD AUTO: 4.92 M/UL (ref 4.2–5.4)
SODIUM SERPL-SCNC: 139 MMOL/L (ref 135–145)
WBC # BLD AUTO: 4.9 K/UL (ref 4.8–10.8)

## 2022-02-28 PROCEDURE — 80048 BASIC METABOLIC PNL TOTAL CA: CPT

## 2022-02-28 PROCEDURE — 85027 COMPLETE CBC AUTOMATED: CPT

## 2022-02-28 PROCEDURE — 36415 COLL VENOUS BLD VENIPUNCTURE: CPT

## 2022-02-28 ASSESSMENT — FIBROSIS 4 INDEX: FIB4 SCORE: 3.43

## 2022-03-14 ENCOUNTER — PRE-ADMISSION TESTING (OUTPATIENT)
Dept: ADMISSIONS | Facility: MEDICAL CENTER | Age: 52
End: 2022-03-14
Attending: OBSTETRICS & GYNECOLOGY
Payer: COMMERCIAL

## 2022-03-14 DIAGNOSIS — Z01.812 PRE-OPERATIVE LABORATORY EXAMINATION: ICD-10-CM

## 2022-03-14 LAB — COVID ORDER STATUS COVID19: NORMAL

## 2022-03-14 PROCEDURE — U0005 INFEC AGEN DETEC AMPLI PROBE: HCPCS

## 2022-03-14 PROCEDURE — U0003 INFECTIOUS AGENT DETECTION BY NUCLEIC ACID (DNA OR RNA); SEVERE ACUTE RESPIRATORY SYNDROME CORONAVIRUS 2 (SARS-COV-2) (CORONAVIRUS DISEASE [COVID-19]), AMPLIFIED PROBE TECHNIQUE, MAKING USE OF HIGH THROUGHPUT TECHNOLOGIES AS DESCRIBED BY CMS-2020-01-R: HCPCS

## 2022-03-15 LAB
SARS-COV-2 RNA RESP QL NAA+PROBE: NOTDETECTED
SPECIMEN SOURCE: NORMAL

## 2022-03-17 ENCOUNTER — ANESTHESIA EVENT (OUTPATIENT)
Dept: SURGERY | Facility: MEDICAL CENTER | Age: 52
End: 2022-03-17
Payer: COMMERCIAL

## 2022-03-17 ENCOUNTER — HOSPITAL ENCOUNTER (OUTPATIENT)
Facility: MEDICAL CENTER | Age: 52
End: 2022-03-17
Attending: OBSTETRICS & GYNECOLOGY | Admitting: OBSTETRICS & GYNECOLOGY
Payer: COMMERCIAL

## 2022-03-17 ENCOUNTER — ANESTHESIA (OUTPATIENT)
Dept: SURGERY | Facility: MEDICAL CENTER | Age: 52
End: 2022-03-17
Payer: COMMERCIAL

## 2022-03-17 VITALS
HEART RATE: 99 BPM | DIASTOLIC BLOOD PRESSURE: 69 MMHG | RESPIRATION RATE: 16 BRPM | HEIGHT: 62 IN | SYSTOLIC BLOOD PRESSURE: 117 MMHG | OXYGEN SATURATION: 96 % | WEIGHT: 132.72 LBS | TEMPERATURE: 97.2 F | BODY MASS INDEX: 24.42 KG/M2

## 2022-03-17 PROBLEM — N73.6 PELVIC PERITONEAL ADHESIONS, FEMALE: Status: ACTIVE | Noted: 2022-03-17

## 2022-03-17 LAB — PATHOLOGY CONSULT NOTE: NORMAL

## 2022-03-17 PROCEDURE — 700111 HCHG RX REV CODE 636 W/ 250 OVERRIDE (IP): Performed by: ANESTHESIOLOGY

## 2022-03-17 PROCEDURE — 88305 TISSUE EXAM BY PATHOLOGIST: CPT

## 2022-03-17 PROCEDURE — A9270 NON-COVERED ITEM OR SERVICE: HCPCS | Performed by: ANESTHESIOLOGY

## 2022-03-17 PROCEDURE — 500868 HCHG NEEDLE, SURGI(VARES): Performed by: OBSTETRICS & GYNECOLOGY

## 2022-03-17 PROCEDURE — 501582 HCHG TROCAR, THRD BLADED: Performed by: OBSTETRICS & GYNECOLOGY

## 2022-03-17 PROCEDURE — 160046 HCHG PACU - 1ST 60 MINS PHASE II: Performed by: OBSTETRICS & GYNECOLOGY

## 2022-03-17 PROCEDURE — A9270 NON-COVERED ITEM OR SERVICE: HCPCS | Performed by: OBSTETRICS & GYNECOLOGY

## 2022-03-17 PROCEDURE — 500886 HCHG PACK, LAPAROSCOPY: Performed by: OBSTETRICS & GYNECOLOGY

## 2022-03-17 PROCEDURE — 160002 HCHG RECOVERY MINUTES (STAT): Performed by: OBSTETRICS & GYNECOLOGY

## 2022-03-17 PROCEDURE — 501838 HCHG SUTURE GENERAL: Performed by: OBSTETRICS & GYNECOLOGY

## 2022-03-17 PROCEDURE — 700102 HCHG RX REV CODE 250 W/ 637 OVERRIDE(OP): Performed by: OBSTETRICS & GYNECOLOGY

## 2022-03-17 PROCEDURE — 160036 HCHG PACU - EA ADDL 30 MINS PHASE I: Performed by: OBSTETRICS & GYNECOLOGY

## 2022-03-17 PROCEDURE — 160028 HCHG SURGERY MINUTES - 1ST 30 MINS LEVEL 3: Performed by: OBSTETRICS & GYNECOLOGY

## 2022-03-17 PROCEDURE — 700101 HCHG RX REV CODE 250: Performed by: OBSTETRICS & GYNECOLOGY

## 2022-03-17 PROCEDURE — 160009 HCHG ANES TIME/MIN: Performed by: OBSTETRICS & GYNECOLOGY

## 2022-03-17 PROCEDURE — 700102 HCHG RX REV CODE 250 W/ 637 OVERRIDE(OP): Performed by: ANESTHESIOLOGY

## 2022-03-17 PROCEDURE — 160039 HCHG SURGERY MINUTES - EA ADDL 1 MIN LEVEL 3: Performed by: OBSTETRICS & GYNECOLOGY

## 2022-03-17 PROCEDURE — 501586 HCHG TROCAR, THRD SPIKE 5X55: Performed by: OBSTETRICS & GYNECOLOGY

## 2022-03-17 PROCEDURE — 700101 HCHG RX REV CODE 250: Performed by: ANESTHESIOLOGY

## 2022-03-17 PROCEDURE — 700105 HCHG RX REV CODE 258: Performed by: OBSTETRICS & GYNECOLOGY

## 2022-03-17 PROCEDURE — 501574 HCHG TROCAR, SMTH CAN&SEAL 5: Performed by: OBSTETRICS & GYNECOLOGY

## 2022-03-17 PROCEDURE — 160025 RECOVERY II MINUTES (STATS): Performed by: OBSTETRICS & GYNECOLOGY

## 2022-03-17 PROCEDURE — 160035 HCHG PACU - 1ST 60 MINS PHASE I: Performed by: OBSTETRICS & GYNECOLOGY

## 2022-03-17 PROCEDURE — 160048 HCHG OR STATISTICAL LEVEL 1-5: Performed by: OBSTETRICS & GYNECOLOGY

## 2022-03-17 RX ORDER — DIPHENHYDRAMINE HYDROCHLORIDE 50 MG/ML
12.5 INJECTION INTRAMUSCULAR; INTRAVENOUS
Status: DISCONTINUED | OUTPATIENT
Start: 2022-03-17 | End: 2022-03-17 | Stop reason: HOSPADM

## 2022-03-17 RX ORDER — ACETAMINOPHEN 500 MG
1000 TABLET ORAL ONCE
Status: DISCONTINUED | OUTPATIENT
Start: 2022-03-17 | End: 2022-03-17

## 2022-03-17 RX ORDER — HYDRALAZINE HYDROCHLORIDE 20 MG/ML
INJECTION INTRAMUSCULAR; INTRAVENOUS PRN
Status: DISCONTINUED | OUTPATIENT
Start: 2022-03-17 | End: 2022-03-17 | Stop reason: SURG

## 2022-03-17 RX ORDER — CEFAZOLIN SODIUM 1 G/3ML
INJECTION, POWDER, FOR SOLUTION INTRAMUSCULAR; INTRAVENOUS PRN
Status: DISCONTINUED | OUTPATIENT
Start: 2022-03-17 | End: 2022-03-17 | Stop reason: SURG

## 2022-03-17 RX ORDER — SODIUM CHLORIDE, SODIUM LACTATE, POTASSIUM CHLORIDE, CALCIUM CHLORIDE 600; 310; 30; 20 MG/100ML; MG/100ML; MG/100ML; MG/100ML
INJECTION, SOLUTION INTRAVENOUS CONTINUOUS
Status: DISCONTINUED | OUTPATIENT
Start: 2022-03-17 | End: 2022-03-17 | Stop reason: HOSPADM

## 2022-03-17 RX ORDER — ROCURONIUM BROMIDE 10 MG/ML
INJECTION, SOLUTION INTRAVENOUS PRN
Status: DISCONTINUED | OUTPATIENT
Start: 2022-03-17 | End: 2022-03-17 | Stop reason: SURG

## 2022-03-17 RX ORDER — HALOPERIDOL 5 MG/ML
1 INJECTION INTRAMUSCULAR
Status: DISCONTINUED | OUTPATIENT
Start: 2022-03-17 | End: 2022-03-17 | Stop reason: HOSPADM

## 2022-03-17 RX ORDER — HYDROMORPHONE HYDROCHLORIDE 1 MG/ML
0.2 INJECTION, SOLUTION INTRAMUSCULAR; INTRAVENOUS; SUBCUTANEOUS
Status: DISCONTINUED | OUTPATIENT
Start: 2022-03-17 | End: 2022-03-17 | Stop reason: HOSPADM

## 2022-03-17 RX ORDER — PROMETHAZINE HYDROCHLORIDE 25 MG/1
12.5 SUPPOSITORY RECTAL EVERY 4 HOURS PRN
Status: DISCONTINUED | OUTPATIENT
Start: 2022-03-17 | End: 2022-03-17 | Stop reason: HOSPADM

## 2022-03-17 RX ORDER — GABAPENTIN 300 MG/1
300 CAPSULE ORAL ONCE
Status: COMPLETED | OUTPATIENT
Start: 2022-03-17 | End: 2022-03-17

## 2022-03-17 RX ORDER — CELECOXIB 200 MG/1
400 CAPSULE ORAL ONCE
Status: COMPLETED | OUTPATIENT
Start: 2022-03-17 | End: 2022-03-17

## 2022-03-17 RX ORDER — HYDROMORPHONE HYDROCHLORIDE 1 MG/ML
0.4 INJECTION, SOLUTION INTRAMUSCULAR; INTRAVENOUS; SUBCUTANEOUS
Status: DISCONTINUED | OUTPATIENT
Start: 2022-03-17 | End: 2022-03-17 | Stop reason: HOSPADM

## 2022-03-17 RX ORDER — SODIUM CHLORIDE, SODIUM LACTATE, POTASSIUM CHLORIDE, CALCIUM CHLORIDE 600; 310; 30; 20 MG/100ML; MG/100ML; MG/100ML; MG/100ML
INJECTION, SOLUTION INTRAVENOUS CONTINUOUS
Status: ACTIVE | OUTPATIENT
Start: 2022-03-17 | End: 2022-03-17

## 2022-03-17 RX ORDER — OXYCODONE HCL 5 MG/5 ML
5 SOLUTION, ORAL ORAL
Status: COMPLETED | OUTPATIENT
Start: 2022-03-17 | End: 2022-03-17

## 2022-03-17 RX ORDER — GABAPENTIN 300 MG/1
600 CAPSULE ORAL ONCE
Status: DISCONTINUED | OUTPATIENT
Start: 2022-03-17 | End: 2022-03-17

## 2022-03-17 RX ORDER — METOCLOPRAMIDE HYDROCHLORIDE 5 MG/ML
INJECTION INTRAMUSCULAR; INTRAVENOUS PRN
Status: DISCONTINUED | OUTPATIENT
Start: 2022-03-17 | End: 2022-03-17 | Stop reason: SURG

## 2022-03-17 RX ORDER — PHENAZOPYRIDINE HYDROCHLORIDE 200 MG/1
200 TABLET, FILM COATED ORAL
Status: COMPLETED | OUTPATIENT
Start: 2022-03-17 | End: 2022-03-17

## 2022-03-17 RX ORDER — ACETAMINOPHEN 500 MG
1000 TABLET ORAL ONCE
Status: COMPLETED | OUTPATIENT
Start: 2022-03-17 | End: 2022-03-17

## 2022-03-17 RX ORDER — BUPIVACAINE HYDROCHLORIDE AND EPINEPHRINE 5; 5 MG/ML; UG/ML
INJECTION, SOLUTION EPIDURAL; INTRACAUDAL; PERINEURAL
Status: DISCONTINUED | OUTPATIENT
Start: 2022-03-17 | End: 2022-03-17 | Stop reason: HOSPADM

## 2022-03-17 RX ORDER — LIDOCAINE HYDROCHLORIDE 20 MG/ML
INJECTION, SOLUTION EPIDURAL; INFILTRATION; INTRACAUDAL; PERINEURAL PRN
Status: DISCONTINUED | OUTPATIENT
Start: 2022-03-17 | End: 2022-03-17 | Stop reason: SURG

## 2022-03-17 RX ORDER — HYDROMORPHONE HYDROCHLORIDE 1 MG/ML
0.1 INJECTION, SOLUTION INTRAMUSCULAR; INTRAVENOUS; SUBCUTANEOUS
Status: DISCONTINUED | OUTPATIENT
Start: 2022-03-17 | End: 2022-03-17 | Stop reason: HOSPADM

## 2022-03-17 RX ORDER — MIDAZOLAM HYDROCHLORIDE 1 MG/ML
INJECTION INTRAMUSCULAR; INTRAVENOUS PRN
Status: DISCONTINUED | OUTPATIENT
Start: 2022-03-17 | End: 2022-03-17 | Stop reason: SURG

## 2022-03-17 RX ORDER — MEPERIDINE HYDROCHLORIDE 25 MG/ML
6.25 INJECTION INTRAMUSCULAR; INTRAVENOUS; SUBCUTANEOUS
Status: DISCONTINUED | OUTPATIENT
Start: 2022-03-17 | End: 2022-03-17 | Stop reason: HOSPADM

## 2022-03-17 RX ORDER — HYDRALAZINE HYDROCHLORIDE 20 MG/ML
5 INJECTION INTRAMUSCULAR; INTRAVENOUS
Status: DISCONTINUED | OUTPATIENT
Start: 2022-03-17 | End: 2022-03-17 | Stop reason: HOSPADM

## 2022-03-17 RX ORDER — ONDANSETRON 2 MG/ML
4 INJECTION INTRAMUSCULAR; INTRAVENOUS
Status: DISCONTINUED | OUTPATIENT
Start: 2022-03-17 | End: 2022-03-17 | Stop reason: HOSPADM

## 2022-03-17 RX ORDER — METOPROLOL TARTRATE 1 MG/ML
1 INJECTION, SOLUTION INTRAVENOUS
Status: DISCONTINUED | OUTPATIENT
Start: 2022-03-17 | End: 2022-03-17 | Stop reason: HOSPADM

## 2022-03-17 RX ORDER — DEXAMETHASONE SODIUM PHOSPHATE 4 MG/ML
INJECTION, SOLUTION INTRA-ARTICULAR; INTRALESIONAL; INTRAMUSCULAR; INTRAVENOUS; SOFT TISSUE PRN
Status: DISCONTINUED | OUTPATIENT
Start: 2022-03-17 | End: 2022-03-17 | Stop reason: SURG

## 2022-03-17 RX ORDER — OXYCODONE HCL 5 MG/5 ML
10 SOLUTION, ORAL ORAL
Status: COMPLETED | OUTPATIENT
Start: 2022-03-17 | End: 2022-03-17

## 2022-03-17 RX ADMIN — MEPERIDINE HYDROCHLORIDE 6.25 MG: 25 INJECTION INTRAMUSCULAR; INTRAVENOUS; SUBCUTANEOUS at 14:33

## 2022-03-17 RX ADMIN — DEXAMETHASONE SODIUM PHOSPHATE 8 MG: 4 INJECTION, SOLUTION INTRA-ARTICULAR; INTRALESIONAL; INTRAMUSCULAR; INTRAVENOUS; SOFT TISSUE at 13:22

## 2022-03-17 RX ADMIN — OXYCODONE HYDROCHLORIDE 5 MG: 5 SOLUTION ORAL at 14:28

## 2022-03-17 RX ADMIN — PHENAZOPYRIDINE HYDROCHLORIDE 200 MG: 200 TABLET ORAL at 11:53

## 2022-03-17 RX ADMIN — ACETAMINOPHEN 1000 MG: 500 TABLET ORAL at 11:42

## 2022-03-17 RX ADMIN — FENTANYL CITRATE 50 MCG: 50 INJECTION, SOLUTION INTRAMUSCULAR; INTRAVENOUS at 13:14

## 2022-03-17 RX ADMIN — CEFAZOLIN 2 G: 330 INJECTION, POWDER, FOR SOLUTION INTRAMUSCULAR; INTRAVENOUS at 13:09

## 2022-03-17 RX ADMIN — MIDAZOLAM HYDROCHLORIDE 2 MG: 1 INJECTION, SOLUTION INTRAMUSCULAR; INTRAVENOUS at 12:54

## 2022-03-17 RX ADMIN — HYDRALAZINE HYDROCHLORIDE 5 MG: 20 INJECTION INTRAMUSCULAR; INTRAVENOUS at 13:24

## 2022-03-17 RX ADMIN — FENTANYL CITRATE 50 MCG: 50 INJECTION, SOLUTION INTRAMUSCULAR; INTRAVENOUS at 13:41

## 2022-03-17 RX ADMIN — FENTANYL CITRATE 25 MCG: 50 INJECTION, SOLUTION INTRAMUSCULAR; INTRAVENOUS at 14:28

## 2022-03-17 RX ADMIN — GABAPENTIN 300 MG: 300 CAPSULE ORAL at 11:42

## 2022-03-17 RX ADMIN — CELECOXIB 400 MG: 200 CAPSULE ORAL at 11:42

## 2022-03-17 RX ADMIN — ROCURONIUM BROMIDE 35 MG: 10 INJECTION, SOLUTION INTRAVENOUS at 12:56

## 2022-03-17 RX ADMIN — LIDOCAINE HYDROCHLORIDE 60 MG: 20 INJECTION, SOLUTION EPIDURAL; INFILTRATION; INTRACAUDAL at 12:56

## 2022-03-17 RX ADMIN — METOCLOPRAMIDE 10 MG: 5 INJECTION, SOLUTION INTRAMUSCULAR; INTRAVENOUS at 13:27

## 2022-03-17 RX ADMIN — LIDOCAINE HYDROCHLORIDE 0.5 ML: 10 INJECTION, SOLUTION EPIDURAL; INFILTRATION; INTRACAUDAL; PERINEURAL at 11:49

## 2022-03-17 RX ADMIN — SODIUM CHLORIDE, POTASSIUM CHLORIDE, SODIUM LACTATE AND CALCIUM CHLORIDE: 600; 310; 30; 20 INJECTION, SOLUTION INTRAVENOUS at 11:49

## 2022-03-17 RX ADMIN — PROPOFOL 150 MG: 10 INJECTION, EMULSION INTRAVENOUS at 12:56

## 2022-03-17 ASSESSMENT — PAIN DESCRIPTION - PAIN TYPE
TYPE: SURGICAL PAIN

## 2022-03-17 ASSESSMENT — FIBROSIS 4 INDEX: FIB4 SCORE: 1.66

## 2022-03-17 NOTE — OR NURSING
1407: Pt arrived from OR, handoff received from anesthesiologist and RN. Lap sites x 3 with dermabond C/D/I.      1428: Patient medicated with oral pain meds per anesthesia orders.     1453: Call made to patient's sister to update patient status.    1513: Handoff to ROSA Mccormick in phase 2.

## 2022-03-17 NOTE — ANESTHESIA TIME REPORT
Anesthesia Start and Stop Event Times     Date Time Event    3/17/2022 1250 Ready for Procedure     1251 Anesthesia Start     1407 Anesthesia Stop        Responsible Staff  03/17/22    Name Role Begin End    Ace Walker M.D. Anesth 1251 1407        Preop Diagnosis (Free Text):  Pre-op Diagnosis     BRCA-1 CARRIER        Preop Diagnosis (Codes):    Premium Reason  Non-Premium    Comments:

## 2022-03-17 NOTE — DISCHARGE INSTRUCTIONS
ACTIVITY: Rest and take it easy for the first 24 hours.  A responsible adult is recommended to remain with you during that time.  It is normal to feel sleepy.  We encourage you to not do anything that requires balance, judgment or coordination.    MILD FLU-LIKE SYMPTOMS ARE NORMAL. YOU MAY EXPERIENCE GENERALIZED MUSCLE ACHES, THROAT IRRITATION, HEADACHE AND/OR SOME NAUSEA.    FOR 24 HOURS DO NOT:  Drive, operate machinery or run household appliances.  Drink beer or alcoholic beverages.   Make important decisions or sign legal documents.    SPECIAL INSTRUCTIONS: Give copy of Gynecological Post-Operative Discharge Instructions    DIET: To avoid nausea, slowly advance diet as tolerated, avoiding spicy or greasy foods for the first day.  Add more substantial food to your diet according to your physician's instructions.  Babies can be fed formula or breast milk as soon as they are hungry.  INCREASE FLUIDS AND FIBER TO AVOID CONSTIPATION.    SURGICAL DRESSING/BATHING: Ok to shower. Pat dressings dry, do not rub. No bath tubs, hot tubs, or swimming pools until cleared by your doctor     FOLLOW-UP APPOINTMENT:  A follow-up appointment should be arranged with your doctor; call to schedule.    You should CALL YOUR PHYSICIAN if you develop:  Fever greater than 101 degrees F.  Pain not relieved by medication, or persistent nausea or vomiting.  Excessive bleeding (blood soaking through dressing) or unexpected drainage from the wound.  Extreme redness or swelling around the incision site, drainage of pus or foul smelling drainage.  Inability to urinate or empty your bladder within 8 hours.  Problems with breathing or chest pain.    You should call 911 if you develop problems with breathing or chest pain.  If you are unable to contact your doctor or surgical center, you should go to the nearest emergency room or urgent care center.    Physician's telephone #: [phone] Dr. Patel    If any questions arise, call your  doctor.  If your doctor is not available, please feel free to call the Surgical Center at [phone].     A registered nurse may call you a few days after your surgery to see how you are doing after your procedure.    MEDICATIONS: Resume taking daily medication.  Take prescribed pain medication with food.  If no medication is prescribed, you may take non-aspirin pain medication if needed.  PAIN MEDICATION CAN BE VERY CONSTIPATING.  Take a stool softener or laxative such as senokot, pericolace, or milk of magnesia if needed.    Last pain medication given at 2:28pm    If your physician has prescribed pain medication that includes Acetaminophen (Tylenol), do not take additional Acetaminophen (Tylenol) while taking the prescribed medication.    Depression / Suicide Risk    As you are discharged from this [facility] facility, it is important to learn how to keep safe from harming yourself.    Recognize the warning signs:  · Abrupt changes in personality, positive or negative- including increase in energy   · Giving away possessions  · Change in eating patterns- significant weight changes-  positive or negative  · Change in sleeping patterns- unable to sleep or sleeping all the time   · Unwillingness or inability to communicate  · Depression  · Unusual sadness, discouragement and loneliness  · Talk of wanting to die  · Neglect of personal appearance   · Rebelliousness- reckless behavior  · Withdrawal from people/activities they love  · Confusion- inability to concentrate     If you or a loved one observes any of these behaviors or has concerns about self-harm, here's what you can do:  · Talk about it- your feelings and reasons for harming yourself  · Remove any means that you might use to hurt yourself (examples: pills, rope, extension cords, firearm)  · Get professional help from the community (Mental Health, Substance Abuse, psychological counseling)  · Do not be alone:Call your Safe Contact- someone whom you  trust who will be there for you.  · Call your local CRISIS HOTLINE 889-7867 or 847-780-9191  · Call your local Children's Mobile Crisis Response Team [place] (229) 592-1708 or www.My Damn Channel  · Call the toll free National Suicide Prevention Hotlines   · National Suicide Prevention Lifeline 223-793-TBTK (4327)  · National [employer] Line Network 800-SUICIDE (833-7231)

## 2022-03-17 NOTE — ANESTHESIA PROCEDURE NOTES
Airway    Date/Time: 3/17/2022 12:58 PM  Performed by: Ace Walker M.D.  Authorized by: Ace Walker M.D.     Location:  OR  Urgency:  Elective  Indications for Airway Management:  Anesthesia      Spontaneous Ventilation: absent    Sedation Level:  Deep  Preoxygenated: Yes    Patient Position:  Sniffing  Final Airway Type:  Endotracheal airway  Final Endotracheal Airway:  ETT  Cuffed: Yes    Technique Used for Successful ETT Placement:  Direct laryngoscopy    Insertion Site:  Oral  Blade Type:  Finney  Laryngoscope Blade/Videolaryngoscope Blade Size:  2  ETT Size (mm):  7.0  Measured from:  Teeth  ETT to Teeth (cm):  20  Placement Verified by: auscultation and capnometry    Cormack-Lehane Classification:  Grade I - full view of glottis  Number of Attempts at Approach:  1

## 2022-03-17 NOTE — OR SURGEON
Immediate Post OP Note    PreOp Diagnosis:     BRCA1 carrier      PostOp Diagnosis:     BRCA1 carrier  Left adnexal adhesions      Procedure(s):  LAPAROSCOPIC BILATERAL SALPINGO-OOPHORECTOMY, ADHESIOLYSIS - Wound Class: Clean    Surgeon(s):  Lb Patel M.D.    Anesthesiologist/Type of Anesthesia:  Anesthesiologist: Ace Walker M.D./General    Surgical Staff:  Circulator: Louisa Oswald R.N.  Relief Scrub: Clemente Basurto  Scrub Person: Christine Holland    Specimens removed if any:  ID Type Source Tests Collected by Time Destination   A : A. RIGHT TUBE AND OVARY Tissue Fallopian Tube and ovary;  [facility] protocol PATHOLOGY SPECIMEN Lb Patel M.D. 3/17/2022  1:49 PM    B : B. LEFT TUBE AND OVARY Tissue Fallopian Tube and ovary;  [facility] protocol    PATHOLOGY SPECIMEN Lb Patel M.D. 3/17/2022  1:50 PM        Estimated Blood Loss: 5 cc    Findings: normal tubes and ovaries; adhesions left mtpupj-[id number]-crztukw epiploicae    Complications: none        3/17/2022 2:05 PM Lb Patel M.D.

## 2022-03-17 NOTE — OP REPORT
DATE OF SERVICE:  2022     OPERATIONS:    1.  Laparoscopic adhesiolysis.  2.  Laparoscopic bilateral salpingo-oophorectomy.     SURGEON:  Lb Patel MD     ASSISTANT:  Colette Lutz PA-C     ANESTHESIOLOGIST:  Ace Walker MD     ANESTHESIA:  General.     PREOPERATIVE DIAGNOSES:      1. BRCA1 carrier, increased risk for ovarian cancer.     POSTOPERATIVE DIAGNOSES:    1.  BRCA1 carrier, increased risk for ovarian cancer.  2.  Left adnexal adhesions.     COMPLICATIONS:  None.     ESTIMATED BLOOD LOSS:  5 mL     SPECIMENS SENT TO PATHOLOGY:    1.  Left fallopian tube and ovary.  2.  Right fallopian tube and ovary.     INDICATIONS:  This 51-year-old lady is .  She is a known BRCA1 carrier.    She has completed her childbearing and requested a prophylactic bilateral   salpingo-oophorectomy.  She already had her uterus removed via total   hysterectomy in .  Preoperative pelvic ultrasound was normal.  Neither   ovary was identified.  There was no free fluid seen.     DESCRIPTION OF PROCEDURE:  The patient went to the OR.  General anesthesia was   administered.  We placed her lower legs in padded Roger stirrups with her   thighs slightly flexed.  Bimanual exam under anesthesia revealed no pelvic   masses.  She was prepped and draped.  Martines catheter was placed with sterile   technique.  Timeout was done.  I placed a sponge stick in her vaginal vault   for manipulation during the laparoscopy.  Attention was turned to laparoscopy.     I made an 11 mm midline umbilical incision after infiltrating 0.25% Marcaine   with epinephrine.  A Veress needle was passed intraperitoneally with no   difficulty.  Carbon dioxide was insufflated.  The Veress needle was withdrawn   and an 11 mm trocar and sheath were introduced.  The pelvis appeared normal   except for left adnexal adhesions between the fallopian tube, ovary, pelvic   peritoneum and several appendices epiploicae.  The right fallopian tube and    ovary appeared normal.  There were no right adnexal adhesions.  The vaginal   vault was freely mobile and covered by smooth and glistening peritoneum.  The   upper abdomen appeared normal.     Because of the severity of the left adnexal adhesions, I made two more   accessory incisions.  I made a 7 mm suprapubic midline incision and a 7 mm   left lower quadrant incision after infiltrating 0.25% Marcaine with   epinephrine subcutaneously, the 7 mm trocars and sheaths were introduced   intraperitoneally with laparoscopic guidance.     Using atraumatic grasping forceps and the 5 mm diameter LigaSure vessel   sealer, I meticulously identified, sealed and divided peritoneal adhesions   until the left fallopian tube and ovary were completely free of the   surrounding structures and peritoneum.  The left infundibulopelvic ligament   was identified.  Using the LigaSure vessel sealer, I triply sealed and divided   the left infundibulopelvic ligament 2 cm lateral to the ovary and then sealed   and divided small fragments of broad ligament peritoneum completely freeing   up the left adnexa.     The right infundibulopelvic ligament was triply sealed and divided 2.0 cm   lateral to the ovary.  The broad ligament was then sealed and divided   completely freeing up the right adnexal specimen.     I introduced an 11 mm diameter laparoscopic bag through the umbilical sheath ,  placed both specimens in the bag and extracted the specimens in the intact   bag with no spill.  I identified the left and right sides and the specimens   were sent in separate containers requesting the [facility] protocol.     The operative sites were inspected.  There was good hemostasis noted.  There   was no evidence of bowel injury.  The abdomen was desufflated.  All three   sheaths were removed.  I closed the fascia beneath the umbilical incision with   a single figure-of-eight 0 Vicryl suture.  I closed all three skin incisions   with subcuticular 4-0  Vicryl.  Dermabond wound adhesive was applied to all 3   incisions.  The sponge stick was removed from the vagina intact.  The Martines   catheter was removed.  The patient is in the recovery room in satisfactory   condition.  Sponge and needle counts were correct.        ______________________________  MD PATRICIA Peralta/BRANDON    DD:  03/17/2022 14:18  DT:  03/17/2022 14:50    Job#:  [id number]    CC:Charity Sanchez MD

## 2022-03-18 NOTE — ANESTHESIA POSTPROCEDURE EVALUATION
Patient: Madison Cardona    Procedure Summary     Date: 03/17/22 Room / Location: [facility] 08 / SURGERY [facility]    Anesthesia Start: 1251 Anesthesia Stop: 1407    Procedure: LAPAROSCOPIC BILATERAL SALPINGO-OOPHORECTOMY, ADHESIOLYSIS Diagnosis: (LEFT ADNEXAL ADHESION, BRCA-1 CARRIER)    Surgeons: Lb Patel M.D. Responsible Provider: Ace Walker M.D.    Anesthesia Type: general ASA Status: 2          Final Anesthesia Type: general  Last vitals  BP   Blood Pressure: 117/69    Temp   36.2 °C (97.2 °F)    Pulse   99   Resp   16    SpO2   96 %      Anesthesia Post Evaluation    Patient location during evaluation: PACU  Patient participation: complete - patient participated  Level of consciousness: awake and alert    Airway patency: patent  Anesthetic complications: no  Cardiovascular status: hemodynamically stable  Respiratory status: acceptable  Hydration status: euvolemic    PONV: none          No complications documented.     Nurse Pain Score: 0 (NPRS)

## 2022-03-22 ENCOUNTER — APPOINTMENT (OUTPATIENT)
Dept: RADIOLOGY | Facility: MEDICAL CENTER | Age: 52
End: 2022-03-22
Attending: PHYSICIAN ASSISTANT
Payer: COMMERCIAL

## 2022-03-30 ENCOUNTER — APPOINTMENT (OUTPATIENT)
Dept: RADIOLOGY | Facility: MEDICAL CENTER | Age: 52
End: 2022-03-30
Attending: INTERNAL MEDICINE
Payer: COMMERCIAL

## 2022-03-30 DIAGNOSIS — Z12.31 VISIT FOR SCREENING MAMMOGRAM: ICD-10-CM

## 2022-04-15 ENCOUNTER — HOSPITAL ENCOUNTER (OUTPATIENT)
Dept: RADIOLOGY | Facility: MEDICAL CENTER | Age: 52
End: 2022-04-15
Attending: PHYSICIAN ASSISTANT
Payer: COMMERCIAL

## 2022-04-15 DIAGNOSIS — Z80.3 FAMILY HISTORY OF MALIGNANT NEOPLASM OF BREAST: ICD-10-CM

## 2022-04-15 PROCEDURE — C8908 MRI W/O FOL W/CONT, BREAST,: HCPCS

## 2022-04-15 PROCEDURE — A9576 INJ PROHANCE MULTIPACK: HCPCS | Performed by: PHYSICIAN ASSISTANT

## 2022-04-15 PROCEDURE — 700117 HCHG RX CONTRAST REV CODE 255: Performed by: PHYSICIAN ASSISTANT

## 2022-04-15 RX ADMIN — GADOTERIDOL 13 ML: 279.3 INJECTION, SOLUTION INTRAVENOUS at 11:47

## 2022-09-16 ENCOUNTER — PRE-ADMISSION TESTING (OUTPATIENT)
Dept: ADMISSIONS | Facility: MEDICAL CENTER | Age: 52
End: 2022-09-16
Attending: SURGERY
Payer: COMMERCIAL

## 2022-09-16 DIAGNOSIS — Z01.810 PRE-OPERATIVE CARDIOVASCULAR EXAMINATION: ICD-10-CM

## 2022-09-16 DIAGNOSIS — Z01.812 PRE-OPERATIVE LABORATORY EXAMINATION: ICD-10-CM

## 2022-09-16 LAB
ALBUMIN SERPL BCP-MCNC: 5 G/DL (ref 3.2–4.9)
ALBUMIN/GLOB SERPL: 1.9 G/DL
ALP SERPL-CCNC: 47 U/L (ref 30–99)
ALT SERPL-CCNC: 15 U/L (ref 2–50)
ANION GAP SERPL CALC-SCNC: 12 MMOL/L (ref 7–16)
AST SERPL-CCNC: 17 U/L (ref 12–45)
BASOPHILS # BLD AUTO: 0.3 % (ref 0–1.8)
BASOPHILS # BLD: 0.02 K/UL (ref 0–0.12)
BILIRUB SERPL-MCNC: 0.7 MG/DL (ref 0.1–1.5)
BUN SERPL-MCNC: 14 MG/DL (ref 8–22)
CALCIUM SERPL-MCNC: 9.7 MG/DL (ref 8.5–10.5)
CHLORIDE SERPL-SCNC: 98 MMOL/L (ref 96–112)
CO2 SERPL-SCNC: 25 MMOL/L (ref 20–33)
CREAT SERPL-MCNC: 0.75 MG/DL (ref 0.5–1.4)
EKG IMPRESSION: NORMAL
EOSINOPHIL # BLD AUTO: 0.05 K/UL (ref 0–0.51)
EOSINOPHIL NFR BLD: 0.8 % (ref 0–6.9)
ERYTHROCYTE [DISTWIDTH] IN BLOOD BY AUTOMATED COUNT: 43.8 FL (ref 35.9–50)
GFR SERPLBLD CREATININE-BSD FMLA CKD-EPI: 96 ML/MIN/1.73 M 2
GLOBULIN SER CALC-MCNC: 2.6 G/DL (ref 1.9–3.5)
GLUCOSE SERPL-MCNC: 90 MG/DL (ref 65–99)
HCT VFR BLD AUTO: 43.7 % (ref 37–47)
HGB BLD-MCNC: 14.9 G/DL (ref 12–16)
IMM GRANULOCYTES # BLD AUTO: 0.02 K/UL (ref 0–0.11)
IMM GRANULOCYTES NFR BLD AUTO: 0.3 % (ref 0–0.9)
LYMPHOCYTES # BLD AUTO: 1.34 K/UL (ref 1–4.8)
LYMPHOCYTES NFR BLD: 20.9 % (ref 22–41)
MCH RBC QN AUTO: 31.5 PG (ref 27–33)
MCHC RBC AUTO-ENTMCNC: 34.1 G/DL (ref 33.6–35)
MCV RBC AUTO: 92.4 FL (ref 81.4–97.8)
MONOCYTES # BLD AUTO: 0.46 K/UL (ref 0–0.85)
MONOCYTES NFR BLD AUTO: 7.2 % (ref 0–13.4)
NEUTROPHILS # BLD AUTO: 4.53 K/UL (ref 2–7.15)
NEUTROPHILS NFR BLD: 70.5 % (ref 44–72)
NRBC # BLD AUTO: 0 K/UL
NRBC BLD-RTO: 0 /100 WBC
PLATELET # BLD AUTO: 196 K/UL (ref 164–446)
PMV BLD AUTO: 9.3 FL (ref 9–12.9)
POTASSIUM SERPL-SCNC: 3.7 MMOL/L (ref 3.6–5.5)
PROT SERPL-MCNC: 7.6 G/DL (ref 6–8.2)
RBC # BLD AUTO: 4.73 M/UL (ref 4.2–5.4)
SODIUM SERPL-SCNC: 135 MMOL/L (ref 135–145)
WBC # BLD AUTO: 6.4 K/UL (ref 4.8–10.8)

## 2022-09-16 PROCEDURE — 80053 COMPREHEN METABOLIC PANEL: CPT

## 2022-09-16 PROCEDURE — 85025 COMPLETE CBC W/AUTO DIFF WBC: CPT

## 2022-09-16 PROCEDURE — 36415 COLL VENOUS BLD VENIPUNCTURE: CPT

## 2022-09-16 PROCEDURE — 93005 ELECTROCARDIOGRAM TRACING: CPT

## 2022-09-16 PROCEDURE — 93010 ELECTROCARDIOGRAM REPORT: CPT | Performed by: INTERNAL MEDICINE

## 2022-09-16 ASSESSMENT — FIBROSIS 4 INDEX: FIB4 SCORE: 1.69

## 2022-09-29 ENCOUNTER — ANESTHESIA EVENT (OUTPATIENT)
Dept: SURGERY | Facility: MEDICAL CENTER | Age: 52
End: 2022-09-29
Payer: COMMERCIAL

## 2022-09-29 ENCOUNTER — ANESTHESIA (OUTPATIENT)
Dept: SURGERY | Facility: MEDICAL CENTER | Age: 52
End: 2022-09-29
Payer: COMMERCIAL

## 2022-09-29 ENCOUNTER — HOSPITAL ENCOUNTER (OUTPATIENT)
Facility: MEDICAL CENTER | Age: 52
End: 2022-09-29
Attending: SURGERY | Admitting: SURGERY
Payer: COMMERCIAL

## 2022-09-29 VITALS
RESPIRATION RATE: 18 BRPM | BODY MASS INDEX: 25.22 KG/M2 | WEIGHT: 133.6 LBS | SYSTOLIC BLOOD PRESSURE: 157 MMHG | HEART RATE: 94 BPM | TEMPERATURE: 97.6 F | HEIGHT: 61 IN | OXYGEN SATURATION: 93 % | DIASTOLIC BLOOD PRESSURE: 87 MMHG

## 2022-09-29 PROCEDURE — 160046 HCHG PACU - 1ST 60 MINS PHASE II: Performed by: SURGERY

## 2022-09-29 PROCEDURE — 700101 HCHG RX REV CODE 250: Performed by: ANESTHESIOLOGY

## 2022-09-29 PROCEDURE — 160035 HCHG PACU - 1ST 60 MINS PHASE I: Performed by: SURGERY

## 2022-09-29 PROCEDURE — A9270 NON-COVERED ITEM OR SERVICE: HCPCS | Performed by: ANESTHESIOLOGY

## 2022-09-29 PROCEDURE — 160048 HCHG OR STATISTICAL LEVEL 1-5: Performed by: SURGERY

## 2022-09-29 PROCEDURE — 700111 HCHG RX REV CODE 636 W/ 250 OVERRIDE (IP): Performed by: ANESTHESIOLOGY

## 2022-09-29 PROCEDURE — 160025 RECOVERY II MINUTES (STATS): Performed by: SURGERY

## 2022-09-29 PROCEDURE — 700102 HCHG RX REV CODE 250 W/ 637 OVERRIDE(OP): Performed by: ANESTHESIOLOGY

## 2022-09-29 PROCEDURE — 160041 HCHG SURGERY MINUTES - EA ADDL 1 MIN LEVEL 4: Performed by: SURGERY

## 2022-09-29 PROCEDURE — 700105 HCHG RX REV CODE 258: Performed by: SURGERY

## 2022-09-29 PROCEDURE — 160029 HCHG SURGERY MINUTES - 1ST 30 MINS LEVEL 4: Performed by: SURGERY

## 2022-09-29 PROCEDURE — 160002 HCHG RECOVERY MINUTES (STAT): Performed by: SURGERY

## 2022-09-29 PROCEDURE — 700111 HCHG RX REV CODE 636 W/ 250 OVERRIDE (IP): Performed by: PLASTIC SURGERY

## 2022-09-29 PROCEDURE — 700104 HCHG RX REV CODE 254: Performed by: ANESTHESIOLOGY

## 2022-09-29 PROCEDURE — 160036 HCHG PACU - EA ADDL 30 MINS PHASE I: Performed by: SURGERY

## 2022-09-29 PROCEDURE — C1889 IMPLANT/INSERT DEVICE, NOC: HCPCS | Performed by: SURGERY

## 2022-09-29 PROCEDURE — 00402 ANES INTEG SYS RCNSTV BREAST: CPT | Performed by: ANESTHESIOLOGY

## 2022-09-29 PROCEDURE — 700101 HCHG RX REV CODE 250: Performed by: PLASTIC SURGERY

## 2022-09-29 PROCEDURE — 110371 HCHG SHELL REV 272: Performed by: SURGERY

## 2022-09-29 PROCEDURE — 160009 HCHG ANES TIME/MIN: Performed by: SURGERY

## 2022-09-29 PROCEDURE — 88307 TISSUE EXAM BY PATHOLOGIST: CPT | Mod: 59

## 2022-09-29 DEVICE — IMPLANTABLE DEVICE: Type: IMPLANTABLE DEVICE | Site: BREAST | Status: FUNCTIONAL

## 2022-09-29 DEVICE — GRAFT MESH ADM SHAPED ALLOMEND 16CM X 20CM 1.0-2.0MM (1EA): Type: IMPLANTABLE DEVICE | Site: BREAST | Status: FUNCTIONAL

## 2022-09-29 RX ORDER — INDOCYANINE GREEN AND WATER 25 MG
KIT INJECTION PRN
Status: DISCONTINUED | OUTPATIENT
Start: 2022-09-29 | End: 2022-09-29 | Stop reason: SURG

## 2022-09-29 RX ORDER — HYDROMORPHONE HYDROCHLORIDE 1 MG/ML
0.4 INJECTION, SOLUTION INTRAMUSCULAR; INTRAVENOUS; SUBCUTANEOUS
Status: DISCONTINUED | OUTPATIENT
Start: 2022-09-29 | End: 2022-09-29 | Stop reason: HOSPADM

## 2022-09-29 RX ORDER — ONDANSETRON 2 MG/ML
INJECTION INTRAMUSCULAR; INTRAVENOUS PRN
Status: DISCONTINUED | OUTPATIENT
Start: 2022-09-29 | End: 2022-09-29 | Stop reason: SURG

## 2022-09-29 RX ORDER — ONDANSETRON 2 MG/ML
4 INJECTION INTRAMUSCULAR; INTRAVENOUS
Status: DISCONTINUED | OUTPATIENT
Start: 2022-09-29 | End: 2022-09-29 | Stop reason: HOSPADM

## 2022-09-29 RX ORDER — DIPHENHYDRAMINE HYDROCHLORIDE 50 MG/ML
12.5 INJECTION INTRAMUSCULAR; INTRAVENOUS
Status: DISCONTINUED | OUTPATIENT
Start: 2022-09-29 | End: 2022-09-29 | Stop reason: HOSPADM

## 2022-09-29 RX ORDER — GENTAMICIN SULFATE 40 MG/ML
INJECTION, SOLUTION INTRAMUSCULAR; INTRAVENOUS
Status: DISCONTINUED
Start: 2022-09-29 | End: 2022-09-29 | Stop reason: HOSPADM

## 2022-09-29 RX ORDER — HYDROMORPHONE HYDROCHLORIDE 1 MG/ML
0.2 INJECTION, SOLUTION INTRAMUSCULAR; INTRAVENOUS; SUBCUTANEOUS
Status: DISCONTINUED | OUTPATIENT
Start: 2022-09-29 | End: 2022-09-29 | Stop reason: HOSPADM

## 2022-09-29 RX ORDER — BUPIVACAINE HYDROCHLORIDE AND EPINEPHRINE 5; 5 MG/ML; UG/ML
INJECTION, SOLUTION EPIDURAL; INTRACAUDAL; PERINEURAL
Status: DISCONTINUED
Start: 2022-09-29 | End: 2022-09-29 | Stop reason: HOSPADM

## 2022-09-29 RX ORDER — SODIUM CHLORIDE, SODIUM LACTATE, POTASSIUM CHLORIDE, CALCIUM CHLORIDE 600; 310; 30; 20 MG/100ML; MG/100ML; MG/100ML; MG/100ML
INJECTION, SOLUTION INTRAVENOUS CONTINUOUS
Status: ACTIVE | OUTPATIENT
Start: 2022-09-29 | End: 2022-09-29

## 2022-09-29 RX ORDER — MIDAZOLAM HYDROCHLORIDE 1 MG/ML
INJECTION INTRAMUSCULAR; INTRAVENOUS PRN
Status: DISCONTINUED | OUTPATIENT
Start: 2022-09-29 | End: 2022-09-29 | Stop reason: SURG

## 2022-09-29 RX ORDER — MEPERIDINE HYDROCHLORIDE 25 MG/ML
6.25 INJECTION INTRAMUSCULAR; INTRAVENOUS; SUBCUTANEOUS
Status: DISCONTINUED | OUTPATIENT
Start: 2022-09-29 | End: 2022-09-29 | Stop reason: HOSPADM

## 2022-09-29 RX ORDER — HALOPERIDOL 5 MG/ML
1 INJECTION INTRAMUSCULAR
Status: DISCONTINUED | OUTPATIENT
Start: 2022-09-29 | End: 2022-09-29 | Stop reason: HOSPADM

## 2022-09-29 RX ORDER — HYDROMORPHONE HYDROCHLORIDE 2 MG/ML
INJECTION, SOLUTION INTRAMUSCULAR; INTRAVENOUS; SUBCUTANEOUS PRN
Status: DISCONTINUED | OUTPATIENT
Start: 2022-09-29 | End: 2022-09-29 | Stop reason: SURG

## 2022-09-29 RX ORDER — CEFAZOLIN SODIUM 1 G/3ML
INJECTION, POWDER, FOR SOLUTION INTRAMUSCULAR; INTRAVENOUS
Status: COMPLETED
Start: 2022-09-29 | End: 2022-09-29

## 2022-09-29 RX ORDER — CEFAZOLIN SODIUM 1 G/3ML
INJECTION, POWDER, FOR SOLUTION INTRAMUSCULAR; INTRAVENOUS PRN
Status: DISCONTINUED | OUTPATIENT
Start: 2022-09-29 | End: 2022-09-29 | Stop reason: SURG

## 2022-09-29 RX ORDER — OXYCODONE HCL 5 MG/5 ML
5 SOLUTION, ORAL ORAL
Status: DISCONTINUED | OUTPATIENT
Start: 2022-09-29 | End: 2022-09-29 | Stop reason: HOSPADM

## 2022-09-29 RX ORDER — DEXAMETHASONE SODIUM PHOSPHATE 4 MG/ML
INJECTION, SOLUTION INTRA-ARTICULAR; INTRALESIONAL; INTRAMUSCULAR; INTRAVENOUS; SOFT TISSUE PRN
Status: DISCONTINUED | OUTPATIENT
Start: 2022-09-29 | End: 2022-09-29 | Stop reason: SURG

## 2022-09-29 RX ORDER — HYDROMORPHONE HYDROCHLORIDE 1 MG/ML
0.1 INJECTION, SOLUTION INTRAMUSCULAR; INTRAVENOUS; SUBCUTANEOUS
Status: DISCONTINUED | OUTPATIENT
Start: 2022-09-29 | End: 2022-09-29 | Stop reason: HOSPADM

## 2022-09-29 RX ORDER — OXYCODONE HCL 5 MG/5 ML
10 SOLUTION, ORAL ORAL
Status: DISCONTINUED | OUTPATIENT
Start: 2022-09-29 | End: 2022-09-29 | Stop reason: HOSPADM

## 2022-09-29 RX ORDER — ACETAMINOPHEN 500 MG
1000 TABLET ORAL ONCE
Status: COMPLETED | OUTPATIENT
Start: 2022-09-29 | End: 2022-09-29

## 2022-09-29 RX ORDER — BUPIVACAINE HYDROCHLORIDE AND EPINEPHRINE 5; 5 MG/ML; UG/ML
INJECTION, SOLUTION EPIDURAL; INTRACAUDAL; PERINEURAL
Status: DISCONTINUED | OUTPATIENT
Start: 2022-09-29 | End: 2022-09-29 | Stop reason: HOSPADM

## 2022-09-29 RX ORDER — LIDOCAINE HYDROCHLORIDE 20 MG/ML
INJECTION, SOLUTION EPIDURAL; INFILTRATION; INTRACAUDAL; PERINEURAL PRN
Status: DISCONTINUED | OUTPATIENT
Start: 2022-09-29 | End: 2022-09-29 | Stop reason: SURG

## 2022-09-29 RX ADMIN — DEXAMETHASONE SODIUM PHOSPHATE 4 MG: 4 INJECTION, SOLUTION INTRA-ARTICULAR; INTRALESIONAL; INTRAMUSCULAR; INTRAVENOUS; SOFT TISSUE at 14:35

## 2022-09-29 RX ADMIN — FENTANYL CITRATE 50 MCG: 50 INJECTION, SOLUTION INTRAMUSCULAR; INTRAVENOUS at 15:57

## 2022-09-29 RX ADMIN — INDOCYANINE GREEN AND WATER 75 MG: KIT at 16:22

## 2022-09-29 RX ADMIN — LIDOCAINE HYDROCHLORIDE 20 MG: 20 INJECTION, SOLUTION EPIDURAL; INFILTRATION; INTRACAUDAL at 14:28

## 2022-09-29 RX ADMIN — FENTANYL CITRATE 50 MCG: 50 INJECTION, SOLUTION INTRAMUSCULAR; INTRAVENOUS at 14:51

## 2022-09-29 RX ADMIN — INDOCYANINE GREEN AND WATER 75 MG: KIT at 17:04

## 2022-09-29 RX ADMIN — ACETAMINOPHEN 1000 MG: 500 TABLET ORAL at 18:34

## 2022-09-29 RX ADMIN — FENTANYL CITRATE 50 MCG: 50 INJECTION, SOLUTION INTRAMUSCULAR; INTRAVENOUS at 14:28

## 2022-09-29 RX ADMIN — FENTANYL CITRATE 50 MCG: 50 INJECTION, SOLUTION INTRAMUSCULAR; INTRAVENOUS at 15:21

## 2022-09-29 RX ADMIN — FENTANYL CITRATE 50 MCG: 50 INJECTION, SOLUTION INTRAMUSCULAR; INTRAVENOUS at 15:42

## 2022-09-29 RX ADMIN — PROPOFOL 150 MG: 10 INJECTION, EMULSION INTRAVENOUS at 14:28

## 2022-09-29 RX ADMIN — SODIUM CHLORIDE, POTASSIUM CHLORIDE, SODIUM LACTATE AND CALCIUM CHLORIDE: 600; 310; 30; 20 INJECTION, SOLUTION INTRAVENOUS at 12:47

## 2022-09-29 RX ADMIN — CEFAZOLIN 2 G: 330 INJECTION, POWDER, FOR SOLUTION INTRAMUSCULAR; INTRAVENOUS at 14:33

## 2022-09-29 RX ADMIN — ONDANSETRON 4 MG: 2 INJECTION INTRAMUSCULAR; INTRAVENOUS at 17:29

## 2022-09-29 RX ADMIN — HYDROMORPHONE HYDROCHLORIDE 0.5 MG: 2 INJECTION INTRAMUSCULAR; INTRAVENOUS; SUBCUTANEOUS at 17:36

## 2022-09-29 RX ADMIN — MIDAZOLAM HYDROCHLORIDE 2 MG: 1 INJECTION, SOLUTION INTRAMUSCULAR; INTRAVENOUS at 14:22

## 2022-09-29 ASSESSMENT — PAIN SCALES - GENERAL: PAIN_LEVEL: 0

## 2022-09-29 ASSESSMENT — PAIN DESCRIPTION - PAIN TYPE
TYPE: SURGICAL PAIN

## 2022-09-29 ASSESSMENT — FIBROSIS 4 INDEX: FIB4 SCORE: 1.16

## 2022-09-29 NOTE — ANESTHESIA PROCEDURE NOTES
Airway    Date/Time: 9/29/2022 2:31 PM  Performed by: Maria D Mcnulty M.D.  Authorized by: Maria D Mcnulty M.D.     Location:  OR  Urgency:  Elective  Indications for Airway Management:  Anesthesia      Spontaneous Ventilation: absent    Sedation Level:  Deep  Preoxygenated: Yes    Mask Difficulty Assessment:  1 - vent by mask  Final Airway Type:  Supraglottic airway  Final Supraglottic Airway:  Standard LMA    SGA Size:  3  Number of Attempts at Approach:  1  Ventilation Between Attempts:  2 hand mask  Number of Other Approaches Attempted:  0

## 2022-09-29 NOTE — ANESTHESIA PREPROCEDURE EVALUATION
Case: [id number] Date/Time: 09/29/22 1315    Procedures:       BILATERAL TOTAL MASTECTOMIES WITH NIPPLE SPARING IF ABLE (Bilateral)      BILTERAL BREAST RECONSTRUCTION WITH PLACEMENT OF TISSUE EXPANDERS AND USE OF ACELLULAR DERMAL MATRIX      INSERTION, TISSUE EXPANDER    Pre-op diagnosis: FAMILY HISTORY OF MALIGNANT NEOPLASM OF BREAST AND GENETIC SUSEPTIBILITY TO MALIGNANT NEOPLASM OF BREAST    Location: [facility] ROOM 28 / SURGERY SAME DAY [facility]    Surgeons: Elizabet Mcbride M.D.; Gary Guaman M.D.          Relevant Problems   NEURO   (positive) History of alcohol use disorder       Physical Exam    Airway   Mallampati: II  TM distance: >3 FB  Neck ROM: full       Cardiovascular - normal exam  Rhythm: regular  Rate: normal  (-) murmur  Comments: EKG NSR   Dental - normal exam           Pulmonary - normal exam  Breath sounds clear to auscultation     Abdominal    Neurological - normal exam                 Anesthesia Plan    ASA 2       Plan - general       Airway plan will be LMA        Plan Factors:   Patient was previously instructed to abstain from smoking on day of procedure.  Patient did not smoke on day of procedure.      Induction: intravenous    Postoperative Plan: Postoperative administration of opioids is intended.    Pertinent diagnostic labs and testing reviewed    Informed Consent:    Anesthetic plan and risks discussed with patient.    Use of blood products discussed with: patient whom consented to blood products.

## 2022-09-30 NOTE — OR NURSING
2000 : measuring specimen cup left at the beside by family. Family called and given option to come and pick the measuring cups. Per family will obtain measuring cups at any pharmacy.

## 2022-09-30 NOTE — OR NURSING
1754 - received patient from OR. Report from Anesthesiologist and OR RN. Patient on 4L mask at 100% O2. VSS. Monitor connected. *** airway in place. S/P ***, incision ***            *** Patient escorted out to responsible adult via *** by ***. Belongings with patient, ambulated with steady gait. Discharge paperwork and instructions reviewed, signed, and sent with patient.

## 2022-09-30 NOTE — OR SURGEON
Immediate Post OP Note    PreOp Diagnosis: BRCA 2       PostOp Diagnosis: same      Procedure(s):  BILATERAL TOTAL MASTECTOMIES WITH NIPPLE SPARING IF ABLE - Wound Class: Clean  BILTERAL BREAST RECONSTRUCTION WITH PLACEMENT OF TISSUE EXPANDERS AND USE OF ACELLULAR DERMAL MATRIX - Wound Class: Clean  INSERTION, TISSUE EXPANDER - Wound Class: Clean    Surgeon(s):  VALERIE Ayala M.D.    Anesthesiologist/Type of Anesthesia:  Anesthesiologist: Maria D Mcnulty M.D./General    Surgical Staff:  Assistant: SUMMER Riddle  Circulator: Valeria Mercer RYOLANDE; Emma Longo R.N.  Relief Circulator: Anita A Reyes, RYOLANDE; Rosy Amaya R.N.  Scrub Person: Brett Wills; Trini Jean-Baptiste; Reilly Oleary    Specimens removed if any:  ID Type Source Tests Collected by Time Destination   A : LEFT TOTAL MASTECTOMY ; BLACK SUTURE MARKS NIPPLE DUCTS, SHORT WHITE SUTURE SUPERIOR ; LONG WHITE SUTURE LATERAL  Tissue Breast PATHOLOGY SPECIMEN Elizabet Mcbride M.D. 9/29/2022  3:45 PM    B : LEFT RETROAREOLAR TISSUE ; SUTURE MARKS NEW MARGIN  Tissue Breast PATHOLOGY SPECIMEN Elizabet Mcbride M.D. 9/29/2022  3:51 PM    C : RIGHT TOTAL MASTECTOMY ;  BLACK SUTURE MARKS NIPPLE DUCTS, SHORT WHITE SUTURE SUPERIOR ; LONG WHITE SUTURE LATERAL  Tissue Breast PATHOLOGY SPECIMEN Elizabet Mcbride M.D. 9/29/2022  4:54 PM    D : RIGHT RETROAREOLAR TISSUE ; SUTURE MARKS NEW MARGIN  Tissue Breast PATHOLOGY SPECIMEN Elizabet Mcbride M.D. 9/29/2022  4:58 PM        Estimated Blood Loss: minimal    Findings: see operative note    Complications: no apparent    #[id number]        9/29/2022 5:51 PM Gary Guaman M.D.

## 2022-09-30 NOTE — OR NURSING
1754 - received patient from OR. Report from Anesthesiologist and OR RN. Patient on 4L mask at 100% O2. VSS. Monitor connected. No airway in place. S/P bilat mastectomies, incisions DAVIDA, dressing in place     1835 Tylenol given for pain. States no nausea.     1837 Handoff to David LEE

## 2022-09-30 NOTE — ANESTHESIA TIME REPORT
Anesthesia Start and Stop Event Times     Date Time Event    9/29/2022 02:22 PM Anesthesia Start    9/29/2022 05:59 PM Anesthesia Stop        Responsible Staff  09/29/22    Name Role Begin End    Maria D Mcnulty M.D. Anesthesiologist 09/29/22 02:22 PM 09/29/22 05:59 PM        Overtime Reason:  no overtime (within assigned shift)    Comments:

## 2022-09-30 NOTE — ANESTHESIA POSTPROCEDURE EVALUATION
Patient: Madison Cardona    Procedure Summary     Date: 09/29/22 Room / Location: [facility] ROOM 28 / SURGERY SAME DAY [facility]    Anesthesia Start: 1422 Anesthesia Stop: 1759    Procedures:       BILATERAL TOTAL MASTECTOMIES WITH NIPPLE SPARING IF ABLE (Bilateral: Breast)      BILTERAL BREAST RECONSTRUCTION WITH PLACEMENT OF TISSUE EXPANDERS AND USE OF ACELLULAR DERMAL MATRIX (Bilateral: Breast)      INSERTION, TISSUE EXPANDER (Bilateral: Breast) Diagnosis: (FAMILY HISTORY OF MALIGNANT NEOPLASM OF BREAST AND GENETIC SUSEPTIBILITY TO MALIGNANT NEOPLASM OF BREAST)    Surgeons: Elizabet Mcbride M.D.; Gary Guaman M.D. Responsible Provider: Maria D Mcnulty M.D.    Anesthesia Type: general ASA Status: 2          Final Anesthesia Type: general  Last vitals  BP   Blood Pressure: (Abnormal) 140/94    Temp   36.5 °C (97.7 °F)    Pulse   94   Resp   16    SpO2   100 %      Anesthesia Post Evaluation    Patient location during evaluation: PACU  Patient participation: complete - patient participated  Level of consciousness: awake and alert  Pain score: 0    Airway patency: patent  Anesthetic complications: no  Cardiovascular status: hemodynamically stable  Respiratory status: acceptable  Hydration status: euvolemic    PONV: none          No notable events documented.     Nurse Pain Score: 0 (NPRS)

## 2022-09-30 NOTE — OP REPORT
DATE OF SERVICE:  09/29/2022     PREOPERATIVE DIAGNOSIS:  BRCA2 positive with a genetic susceptibility to   breast cancer.     POSTOPERATIVE DIAGNOSIS:  BRCA2 positive with a genetic susceptibility to   breast cancer.     PROCEDURES:    1.  Bilateral breast reconstruction with prepectoral placement of tissue   expanders and use of acellular dermal matrix.  2.  Use of SPY vascular imaging with indocyanine green for evaluation of   mastectomy skin flap perfusion.     ATTENDING SURGEON:  Gary Guaman MD     ANESTHESIOLOGIST:  Maria D Mcnulty MD     ASSISTANT:  Elizabet Mcbride MD    ESTIMATED BLOOD LOSS:  Minimal.     COMPLICATIONS:  No apparent.     SPECIMENS:  None for my portion of the operation.     INDICATIONS FOR PROCEDURE:  The patient is a 52-year-old woman who is BRCA2   positive, who has had recommendations to have bilateral prophylactic   mastectomies.  We discussed doing a nipple-sparing approach to the   inframammary fold.  The patient now presents for the above operation.     INTRAOPERATIVE FINDINGS:  Allergan 550 mL tissue expanders were placed,   reference #[id number].  On the right hand side, there was 150 mL placed and   on the left hand side, there was 100 mL placed.  The patient had 2 pieces of   AlloMend acellular dermal matrix use 16x20 cm on each side.       DESCRIPTION OF PROCEDURE:  After the operative and nonoperative options were   discussed including risks, benefits and alternatives, which included but were   not limited to bleeding, infection, damage to surrounding structure, need for   further surgery, reaction to anesthetic agent, scarring, breast asymmetry,   contour irregularities, wound healing difficulties, need for revisional   surgery, mastectomy, skin flap necrosis, nipple necrosis, need for revisional   surgery, need for explantation, tissue expander failure, tissue expander   rupture, dissatisfaction with result, failure of the acellular dermal matrix   to  incorporate, pneumothorax, deep venous thrombosis, pulmonary embolus,   myocardial infarction, stroke, unsatisfactory result and/or death, informed   consent was obtained.  Preoperatively, the patient was identified.  In a   sitting upright position, the patient's sternal notch, midline and   inframammary folds were marked.  The planned incision lines were drawn along   the patient's existing inframammary fold for the access site for the   mastectomies.  Antibiotics were given.  Sequential compression devices were   placed.  The patient was brought to the operating room.  General anesthesia   was induced.  Please see Dr. Elizabet Mcbride's note for the mastectomy portions   of the operation.  Following completion of this, then SPY vascular imaging was   used.  A 3 mL of indocyanine green was given.  Imaging device was then   brought into position.  On the left hand side, the patient had a slightly   decreased perfusion along the central medial aspect of the mastectomy skin   flap.  On the right hand side, the patient had excellent perfusion throughout.    At this point, I felt the patient would be a reasonable candidate for a   prepectoral breast reconstruction.  The chest wall was then measured.  A 550   mL tissue expander was then chosen.  Both of the pockets were then copiously   irrigated with a 1/2 liter of antibiotic irrigation.  Two 15-Thai round   drains were placed and secured with 3-0 nylon sutures.  Intercostal blocks   were placed with 0.5% Marcaine and packs 1 and 2 blocks were placed for   postoperative pain control.  Then on the back table, the 550 mL tissue   expanders were then inflated with air.  Two 16x20 pieces of acellular dermal   matrix were then used and soaked in antibiotic irrigation.  Then, they were   wrapped around the expanders and secured with combination of running   interrupted 2-0 Vicryl sutures.  After doing this, the tabs were then brought   out through this.  The air was then  removed from the expanders.  Expanders   were then brought up into the breast.  On the left hand side, the first   expander was then sewn into position using 2-0 Ethibond sutures on 3 of the   tabs and 2-0 Vicryl sutures on the other 3 tabs.  Following this, then the   mastectomy skin flap on the upper aspect of the flap was then trimmed with   curved Tracy scissors.  A 2-0 Vicryl suture was then used to run in the deep   tissue.  A 3-0 deep dermal Monocryl sutures were used to close the overlying   skin and a running 4-0 Monocryl subcuticular stitch was used to close the   skin.     We then turned our attention to the contralateral side, where the same   procedure was performed.     We then gave another 3 mL of indocyanine green.  [facility] vascular imaging was   used.  The mastectomy skin flaps were inspected.  On the left hand side, 100   mL was then placed in the expander and on the right hand side, 150 mL was   placed in the expander.  Due to the slightly decreased perfusion on the left   hand side, I felt that a Prevena incision management dressing would be   appropriate.  On this side, then a 13 cm Prevena was then placed and   positioned.  Biopatches and Tegaderms were placed over the drain sites.  She   was then washed.  Steri-Strips were placed on the right hand side and a   compressive dressing.  She was then awakened, extubated and transferred to   PACU in stable condition.  At the end of the procedure, all sponge, instrument   and needle counts were correct.        ______________________________  MD JONNY WHITAKER/NELLY    DD:  09/29/2022 17:58  DT:  09/29/2022 18:24    Job#:  [id number]

## 2022-09-30 NOTE — OP REPORT
Pre op diagnosis:  Genetic susceptibility of malignant breast neoplasm  Post op diagnosis:  Same    Procedure:  Bilateral total mastectomies, nipple areolar sparing    Surgeon:  Elizabet Mcbride MD  Assistant:  Marisela Mccullough CFA  Anesthesiologist:  Maria D Mcnulty MD    Anesthesia:  General  Pre op meds:  Ancef  ASA 2    Indications:  This is a 52 year old female with a BRCA mutation.  Due to her very high risk of developing breast cancer, after discussing risks and benefits of her options, she is ready to proceed with surgery.    Findings:  Adequate perfusion to the left mastectomy flaps.  Significantly better perfusion to the right mastectomy flaps.  Pre-pectoral TE/ACDM by Dr. Guaman.  Both mastectomy specimens have white S-S, white L-L, black suture marking the retroareolar region, and then each side had a retroareolar specimen with a suture marking the new margin.    Summary:  The patient was anesthetized, then prepped and draped in sterile fashion.  Time out was confirmed.  I made a 10cm incision in the inframammary fold on the left, and used the plasma blade to dissect along the anterior mammary fascial plane.  This careful dissection was taken to the sternum, the latissimus dorsi, and initially to the NAC which was relatively ptotic.  A small button of glandular tissue was left directly behind the nipple.  I dissected along the retromammary fascial plane and the fascia was included with the specimen.  This was also taken to the sternum, the latissimus dorsi, and the infraclavicular region.  I then completed the anterior dissection to the infraclavicular region, and placed the marking sutures.  I irrigated and ensured hemostasis, then excised the retroareolar glandular tissue sharply with a suture marking the new margin, leaving no clinically visible glandular tissue behind the nipple.  I ensured hemostasis, then turned to the right side.    I made a symmetrical incision at the inframammary fold, then again used  the plasma blade to dissect along the anterior mammary fascial plane.  This dissection was again taken to the sternum, the latissimus dorsi, and to the NAC which was similarly ptotic.  A small button of glandular tissue was left directly behind the nipple for later excision.  I dissected along the retromammary fascial plane and the fascia was included with the specimen.  This was also taken to the sternum, the latissimus dorsi, and the infraclavicular region.  I then completed the anterior dissection to the infraclavicular region, and placed the marking sutures.  I irrigated and ensured hemostasis, then excised the retroareolar glandular tissue sharply with a suture marking the new margin, leaving no clinically visible glandular tissue behind the nipple.  I ensured hemostasis, then fluorescent imaging using SPY was performed by Dr. Guaman and he completed his portion of the procedure which is dictated separately.  Assistance was required for retraction.  Prevena was placed on the left.      CC:  MD Tami Becker PA-C Timothy Janiga, MD

## 2022-09-30 NOTE — OR NURSING
1837 Report received assumed patient care at this time.   1900 Criteria met to transition patient to phase 2 recovery  1910 JOSE drain instructions given to patient and . Demonstrations on how to empty JOSE drain completed. Both  and patient verbalize understanding. Chart for jose output recording given to patients . Measuring specimen cups given to patients . Prevena book given to .  1916 Discharge instructions given to patient and family verbalize understanding of the orders. Copy of instructions given to patients .  1931 Criteria met to discharge patient home.   1940 patient escorted via  to responsible adult with all personal belongings.

## 2022-09-30 NOTE — DISCHARGE INSTRUCTIONS
HOME CARE INSTRUCTIONS    ACTIVITY: Rest and take it easy for the first 24 hours.  A responsible adult is recommended to remain with you during that time.  It is normal to feel sleepy.  We encourage you to not do anything that requires balance, judgment or coordination.    FOR 24 HOURS DO NOT:  Drive, operate machinery or run household appliances.  Drink beer or alcoholic beverages.  Make important decisions or sign legal documents.    SPECIAL INSTRUCTIONS: Empty Drains at least 3 times daily, more often as needed. Use recording sheet to record output and bring this to follow-up appointment.   Walk regularly.  Review Dr. Mcbride's surgical discharge instructions at home.     DIET: To avoid nausea, slowly advance diet as tolerated, avoiding spicy or greasy foods for the first day.  Add more substantial food to your diet according to your physician's instructions.  Babies can be fed formula or breast milk as soon as they are hungry.  INCREASE FLUIDS AND FIBER TO AVOID CONSTIPATION.    SURGICAL DRESSING/BATHING: Leave everything in place, dressings clean/dry until follow-up appointment with doctor    MEDICATIONS: Resume taking daily medication.  Take prescribed pain medication with food.  If no medication is prescribed, you may take non-aspirin pain medication if needed.  PAIN MEDICATION CAN BE VERY CONSTIPATING.  Take a stool softener or laxative such as senokot, pericolace, or milk of magnesia if needed.     Last pain medication Tylenol given at 6:30PM    A follow-up appointment should be arranged with your doctor; call to schedule.    You should CALL YOUR PHYSICIAN if you develop:  Fever greater than 101 degrees F.  Pain not relieved by medication, or persistent nausea or vomiting.  Excessive bleeding (blood soaking through dressing) or unexpected drainage from the wound.  Extreme redness or swelling around the incision site, drainage of pus or foul smelling drainage.  Inability to urinate or empty your bladder within 8  hours.  Problems with breathing or chest pain.    You should call 911 if you develop problems with breathing or chest pain.  If you are unable to contact your doctor or surgical center, you should go to the nearest emergency room or urgent care center.  Physician's telephone #: Dr. Mcbride [phone]    MILD FLU-LIKE SYMPTOMS ARE NORMAL.  YOU MAY EXPERIENCE GENERALIZED MUSCLE ACHES, THROAT IRRITATION, HEADACHE AND/OR SOME NAUSEA.    If any questions arise, call your doctor.  If your doctor is not available, please feel free to call the Surgical Center at [phone].  The Center is open Monday through Friday from 7AM to 7PM.      A registered nurse may call you a few days after your surgery to see how you are doing after your procedure.    You may also receive a survey in the mail within the next two weeks and we ask that you take a few moments to complete the survey and return it to us.  Our goal is to provide you with very good care and we value your comments.     Depression / Suicide Risk    As you are discharged from this [facility] facility, it is important to learn how to keep safe from harming yourself.    Recognize the warning signs:  Abrupt changes in personality, positive or negative- including increase in energy   Giving away possessions  Change in eating patterns- significant weight changes-  positive or negative  Change in sleeping patterns- unable to sleep or sleeping all the time   Unwillingness or inability to communicate  Depression  Unusual sadness, discouragement and loneliness  Talk of wanting to die  Neglect of personal appearance   Rebelliousness- reckless behavior  Withdrawal from people/activities they love  Confusion- inability to concentrate     If you or a loved one observes any of these behaviors or has concerns about self-harm, here's what you can do:  Talk about it- your feelings and reasons for harming yourself  Remove any means that you might use to hurt yourself (examples: pills,  rope, extension cords, firearm)  Get professional help from the community (Mental Health, Substance Abuse, psychological counseling)  Do not be alone:Call your Safe Contact- someone whom you trust who will be there for you.  Call your local CRISIS HOTLINE 116-7828 or 099-999-4047  Call your local Children's Mobile Crisis Response Team [place] (785) 428-7303 or www.MeshApp  Call the toll free National Suicide Prevention Hotlines   National Suicide Prevention Lifeline 628-134-VBXE (0959)  [facility] Line Network 800-SUICIDE (440-2353)    I acknowledge receipt and understanding of these Home Care instructions.

## 2022-10-25 LAB — PATHOLOGY CONSULT NOTE: NORMAL

## 2023-02-24 ENCOUNTER — PRE-ADMISSION TESTING (OUTPATIENT)
Dept: ADMISSIONS | Facility: MEDICAL CENTER | Age: 53
End: 2023-02-24
Attending: DERMATOLOGY
Payer: COMMERCIAL

## 2023-02-24 DIAGNOSIS — Z01.812 PRE-OPERATIVE LABORATORY EXAMINATION: ICD-10-CM

## 2023-02-24 LAB
BASOPHILS # BLD AUTO: 0.5 % (ref 0–1.8)
BASOPHILS # BLD: 0.03 K/UL (ref 0–0.12)
EOSINOPHIL # BLD AUTO: 0.06 K/UL (ref 0–0.51)
EOSINOPHIL NFR BLD: 1.1 % (ref 0–6.9)
ERYTHROCYTE [DISTWIDTH] IN BLOOD BY AUTOMATED COUNT: 43.1 FL (ref 35.9–50)
HCT VFR BLD AUTO: 43.7 % (ref 37–47)
HGB BLD-MCNC: 14.6 G/DL (ref 12–16)
IMM GRANULOCYTES # BLD AUTO: 0.01 K/UL (ref 0–0.11)
IMM GRANULOCYTES NFR BLD AUTO: 0.2 % (ref 0–0.9)
LYMPHOCYTES # BLD AUTO: 2.13 K/UL (ref 1–4.8)
LYMPHOCYTES NFR BLD: 38.7 % (ref 22–41)
MCH RBC QN AUTO: 30.2 PG (ref 27–33)
MCHC RBC AUTO-ENTMCNC: 33.4 G/DL (ref 33.6–35)
MCV RBC AUTO: 90.5 FL (ref 81.4–97.8)
MONOCYTES # BLD AUTO: 0.35 K/UL (ref 0–0.85)
MONOCYTES NFR BLD AUTO: 6.4 % (ref 0–13.4)
NEUTROPHILS # BLD AUTO: 2.92 K/UL (ref 2–7.15)
NEUTROPHILS NFR BLD: 53.1 % (ref 44–72)
NRBC # BLD AUTO: 0 K/UL
NRBC BLD-RTO: 0 /100 WBC
PLATELET # BLD AUTO: 202 K/UL (ref 164–446)
PMV BLD AUTO: 9.5 FL (ref 9–12.9)
RBC # BLD AUTO: 4.83 M/UL (ref 4.2–5.4)
WBC # BLD AUTO: 5.5 K/UL (ref 4.8–10.8)

## 2023-02-24 PROCEDURE — 36415 COLL VENOUS BLD VENIPUNCTURE: CPT

## 2023-02-24 PROCEDURE — 85025 COMPLETE CBC W/AUTO DIFF WBC: CPT

## 2023-02-24 ASSESSMENT — FIBROSIS 4 INDEX: FIB4 SCORE: 1.16

## 2023-03-01 ENCOUNTER — ANESTHESIA EVENT (OUTPATIENT)
Dept: SURGERY | Facility: MEDICAL CENTER | Age: 53
End: 2023-03-01
Payer: COMMERCIAL

## 2023-03-01 RX ORDER — CELECOXIB 200 MG/1
200 CAPSULE ORAL ONCE
Status: CANCELLED | OUTPATIENT
Start: 2023-03-02 | End: 2023-03-02

## 2023-03-01 RX ORDER — ACETAMINOPHEN 500 MG
1000 TABLET ORAL ONCE
Status: CANCELLED | OUTPATIENT
Start: 2023-03-02 | End: 2023-03-02

## 2023-03-02 ENCOUNTER — HOSPITAL ENCOUNTER (OUTPATIENT)
Facility: MEDICAL CENTER | Age: 53
End: 2023-03-02
Attending: PLASTIC SURGERY | Admitting: PLASTIC SURGERY
Payer: COMMERCIAL

## 2023-03-02 ENCOUNTER — ANESTHESIA (OUTPATIENT)
Dept: SURGERY | Facility: MEDICAL CENTER | Age: 53
End: 2023-03-02
Payer: COMMERCIAL

## 2023-03-02 VITALS
SYSTOLIC BLOOD PRESSURE: 147 MMHG | BODY MASS INDEX: 26.22 KG/M2 | HEIGHT: 61 IN | TEMPERATURE: 97 F | RESPIRATION RATE: 16 BRPM | DIASTOLIC BLOOD PRESSURE: 88 MMHG | OXYGEN SATURATION: 94 % | WEIGHT: 138.89 LBS | HEART RATE: 90 BPM

## 2023-03-02 PROCEDURE — 700105 HCHG RX REV CODE 258: Performed by: PLASTIC SURGERY

## 2023-03-02 PROCEDURE — 700102 HCHG RX REV CODE 250 W/ 637 OVERRIDE(OP): Performed by: ANESTHESIOLOGY

## 2023-03-02 PROCEDURE — 700111 HCHG RX REV CODE 636 W/ 250 OVERRIDE (IP): Performed by: ANESTHESIOLOGY

## 2023-03-02 PROCEDURE — 160009 HCHG ANES TIME/MIN: Performed by: PLASTIC SURGERY

## 2023-03-02 PROCEDURE — 160042 HCHG SURGERY MINUTES - EA ADDL 1 MIN LEVEL 5: Performed by: PLASTIC SURGERY

## 2023-03-02 PROCEDURE — 700111 HCHG RX REV CODE 636 W/ 250 OVERRIDE (IP): Performed by: PLASTIC SURGERY

## 2023-03-02 PROCEDURE — 160046 HCHG PACU - 1ST 60 MINS PHASE II: Performed by: PLASTIC SURGERY

## 2023-03-02 PROCEDURE — 160048 HCHG OR STATISTICAL LEVEL 1-5: Performed by: PLASTIC SURGERY

## 2023-03-02 PROCEDURE — 700101 HCHG RX REV CODE 250: Performed by: PLASTIC SURGERY

## 2023-03-02 PROCEDURE — 160025 RECOVERY II MINUTES (STATS): Performed by: PLASTIC SURGERY

## 2023-03-02 PROCEDURE — A9270 NON-COVERED ITEM OR SERVICE: HCPCS | Performed by: ANESTHESIOLOGY

## 2023-03-02 PROCEDURE — 160035 HCHG PACU - 1ST 60 MINS PHASE I: Performed by: PLASTIC SURGERY

## 2023-03-02 PROCEDURE — 160031 HCHG SURGERY MINUTES - 1ST 30 MINS LEVEL 5: Performed by: PLASTIC SURGERY

## 2023-03-02 PROCEDURE — 160002 HCHG RECOVERY MINUTES (STAT): Performed by: PLASTIC SURGERY

## 2023-03-02 PROCEDURE — 700101 HCHG RX REV CODE 250: Performed by: ANESTHESIOLOGY

## 2023-03-02 PROCEDURE — 00402 ANES INTEG SYS RCNSTV BREAST: CPT | Performed by: ANESTHESIOLOGY

## 2023-03-02 PROCEDURE — C1789 PROSTHESIS, BREAST, IMP: HCPCS | Performed by: PLASTIC SURGERY

## 2023-03-02 DEVICE — IMPLANTABLE DEVICE: Type: IMPLANTABLE DEVICE | Site: BREAST | Status: FUNCTIONAL

## 2023-03-02 RX ORDER — EPHEDRINE SULFATE 50 MG/ML
5 INJECTION, SOLUTION INTRAVENOUS
Status: DISCONTINUED | OUTPATIENT
Start: 2023-03-02 | End: 2023-03-02 | Stop reason: HOSPADM

## 2023-03-02 RX ORDER — EPINEPHRINE 1 MG/ML(1)
AMPUL (ML) INJECTION
Status: DISCONTINUED
Start: 2023-03-02 | End: 2023-03-02 | Stop reason: HOSPADM

## 2023-03-02 RX ORDER — OXYCODONE HCL 5 MG/5 ML
10 SOLUTION, ORAL ORAL
Status: DISCONTINUED | OUTPATIENT
Start: 2023-03-02 | End: 2023-03-02

## 2023-03-02 RX ORDER — HYDRALAZINE HYDROCHLORIDE 20 MG/ML
5 INJECTION INTRAMUSCULAR; INTRAVENOUS
Status: DISCONTINUED | OUTPATIENT
Start: 2023-03-02 | End: 2023-03-02 | Stop reason: HOSPADM

## 2023-03-02 RX ORDER — DEXAMETHASONE SODIUM PHOSPHATE 4 MG/ML
INJECTION, SOLUTION INTRA-ARTICULAR; INTRALESIONAL; INTRAMUSCULAR; INTRAVENOUS; SOFT TISSUE PRN
Status: DISCONTINUED | OUTPATIENT
Start: 2023-03-02 | End: 2023-03-02 | Stop reason: SURG

## 2023-03-02 RX ORDER — LABETALOL HYDROCHLORIDE 5 MG/ML
5 INJECTION, SOLUTION INTRAVENOUS
Status: DISCONTINUED | OUTPATIENT
Start: 2023-03-02 | End: 2023-03-02 | Stop reason: HOSPADM

## 2023-03-02 RX ORDER — LIDOCAINE HYDROCHLORIDE 10 MG/ML
INJECTION, SOLUTION EPIDURAL; INFILTRATION; INTRACAUDAL; PERINEURAL
Status: DISCONTINUED
Start: 2023-03-02 | End: 2023-03-02 | Stop reason: HOSPADM

## 2023-03-02 RX ORDER — SODIUM CHLORIDE, SODIUM LACTATE, POTASSIUM CHLORIDE, CALCIUM CHLORIDE 600; 310; 30; 20 MG/100ML; MG/100ML; MG/100ML; MG/100ML
INJECTION, SOLUTION INTRAVENOUS CONTINUOUS
Status: DISCONTINUED | OUTPATIENT
Start: 2023-03-02 | End: 2023-03-02 | Stop reason: HOSPADM

## 2023-03-02 RX ORDER — HYDROMORPHONE HYDROCHLORIDE 1 MG/ML
0.1 INJECTION, SOLUTION INTRAMUSCULAR; INTRAVENOUS; SUBCUTANEOUS
Status: DISCONTINUED | OUTPATIENT
Start: 2023-03-02 | End: 2023-03-02 | Stop reason: HOSPADM

## 2023-03-02 RX ORDER — BUPIVACAINE HYDROCHLORIDE AND EPINEPHRINE 5; 5 MG/ML; UG/ML
INJECTION, SOLUTION EPIDURAL; INTRACAUDAL; PERINEURAL
Status: DISCONTINUED
Start: 2023-03-02 | End: 2023-03-02 | Stop reason: HOSPADM

## 2023-03-02 RX ORDER — GENTAMICIN SULFATE 40 MG/ML
INJECTION, SOLUTION INTRAMUSCULAR; INTRAVENOUS
Status: DISCONTINUED
Start: 2023-03-02 | End: 2023-03-02 | Stop reason: HOSPADM

## 2023-03-02 RX ORDER — OXYCODONE HYDROCHLORIDE AND ACETAMINOPHEN 5; 325 MG/1; MG/1
2 TABLET ORAL
Status: COMPLETED | OUTPATIENT
Start: 2023-03-02 | End: 2023-03-02

## 2023-03-02 RX ORDER — CEFAZOLIN SODIUM 1 G/3ML
INJECTION, POWDER, FOR SOLUTION INTRAMUSCULAR; INTRAVENOUS
Status: DISCONTINUED
Start: 2023-03-02 | End: 2023-03-02 | Stop reason: HOSPADM

## 2023-03-02 RX ORDER — HYDROMORPHONE HYDROCHLORIDE 1 MG/ML
0.4 INJECTION, SOLUTION INTRAMUSCULAR; INTRAVENOUS; SUBCUTANEOUS
Status: DISCONTINUED | OUTPATIENT
Start: 2023-03-02 | End: 2023-03-02 | Stop reason: HOSPADM

## 2023-03-02 RX ORDER — ONDANSETRON 2 MG/ML
4 INJECTION INTRAMUSCULAR; INTRAVENOUS
Status: DISCONTINUED | OUTPATIENT
Start: 2023-03-02 | End: 2023-03-02 | Stop reason: HOSPADM

## 2023-03-02 RX ORDER — CEFAZOLIN SODIUM 1 G/3ML
INJECTION, POWDER, FOR SOLUTION INTRAMUSCULAR; INTRAVENOUS PRN
Status: DISCONTINUED | OUTPATIENT
Start: 2023-03-02 | End: 2023-03-02 | Stop reason: SURG

## 2023-03-02 RX ORDER — LIDOCAINE HYDROCHLORIDE 20 MG/ML
INJECTION, SOLUTION EPIDURAL; INFILTRATION; INTRACAUDAL; PERINEURAL PRN
Status: DISCONTINUED | OUTPATIENT
Start: 2023-03-02 | End: 2023-03-02 | Stop reason: SURG

## 2023-03-02 RX ORDER — MIDAZOLAM HYDROCHLORIDE 1 MG/ML
INJECTION INTRAMUSCULAR; INTRAVENOUS PRN
Status: DISCONTINUED | OUTPATIENT
Start: 2023-03-02 | End: 2023-03-02 | Stop reason: SURG

## 2023-03-02 RX ORDER — OXYCODONE HCL 5 MG/5 ML
5 SOLUTION, ORAL ORAL
Status: DISCONTINUED | OUTPATIENT
Start: 2023-03-02 | End: 2023-03-02

## 2023-03-02 RX ORDER — HALOPERIDOL 5 MG/ML
1 INJECTION INTRAMUSCULAR
Status: DISCONTINUED | OUTPATIENT
Start: 2023-03-02 | End: 2023-03-02 | Stop reason: HOSPADM

## 2023-03-02 RX ORDER — DIPHENHYDRAMINE HYDROCHLORIDE 50 MG/ML
12.5 INJECTION INTRAMUSCULAR; INTRAVENOUS
Status: DISCONTINUED | OUTPATIENT
Start: 2023-03-02 | End: 2023-03-02 | Stop reason: HOSPADM

## 2023-03-02 RX ORDER — OXYCODONE HYDROCHLORIDE AND ACETAMINOPHEN 5; 325 MG/1; MG/1
1 TABLET ORAL
Status: COMPLETED | OUTPATIENT
Start: 2023-03-02 | End: 2023-03-02

## 2023-03-02 RX ORDER — ONDANSETRON 2 MG/ML
INJECTION INTRAMUSCULAR; INTRAVENOUS PRN
Status: DISCONTINUED | OUTPATIENT
Start: 2023-03-02 | End: 2023-03-02 | Stop reason: SURG

## 2023-03-02 RX ORDER — HYDROMORPHONE HYDROCHLORIDE 1 MG/ML
0.2 INJECTION, SOLUTION INTRAMUSCULAR; INTRAVENOUS; SUBCUTANEOUS
Status: DISCONTINUED | OUTPATIENT
Start: 2023-03-02 | End: 2023-03-02 | Stop reason: HOSPADM

## 2023-03-02 RX ADMIN — OXYCODONE AND ACETAMINOPHEN 2 TABLET: 5; 325 TABLET ORAL at 08:44

## 2023-03-02 RX ADMIN — MIDAZOLAM HYDROCHLORIDE 2 MG: 1 INJECTION, SOLUTION INTRAMUSCULAR; INTRAVENOUS at 07:03

## 2023-03-02 RX ADMIN — DEXAMETHASONE SODIUM PHOSPHATE 8 MG: 4 INJECTION, SOLUTION INTRA-ARTICULAR; INTRALESIONAL; INTRAMUSCULAR; INTRAVENOUS; SOFT TISSUE at 07:09

## 2023-03-02 RX ADMIN — LIDOCAINE HYDROCHLORIDE 100 MG: 20 INJECTION, SOLUTION EPIDURAL; INFILTRATION; INTRACAUDAL at 07:05

## 2023-03-02 RX ADMIN — FENTANYL CITRATE 25 MCG: 50 INJECTION, SOLUTION INTRAMUSCULAR; INTRAVENOUS at 08:49

## 2023-03-02 RX ADMIN — FENTANYL CITRATE 100 MCG: 50 INJECTION, SOLUTION INTRAMUSCULAR; INTRAVENOUS at 07:03

## 2023-03-02 RX ADMIN — FENTANYL CITRATE 25 MCG: 50 INJECTION, SOLUTION INTRAMUSCULAR; INTRAVENOUS at 08:30

## 2023-03-02 RX ADMIN — FENTANYL CITRATE 25 MCG: 50 INJECTION, SOLUTION INTRAMUSCULAR; INTRAVENOUS at 08:33

## 2023-03-02 RX ADMIN — FENTANYL CITRATE 50 MCG: 50 INJECTION, SOLUTION INTRAMUSCULAR; INTRAVENOUS at 07:59

## 2023-03-02 RX ADMIN — SODIUM CHLORIDE, POTASSIUM CHLORIDE, SODIUM LACTATE AND CALCIUM CHLORIDE: 600; 310; 30; 20 INJECTION, SOLUTION INTRAVENOUS at 06:45

## 2023-03-02 RX ADMIN — FENTANYL CITRATE 50 MCG: 50 INJECTION, SOLUTION INTRAMUSCULAR; INTRAVENOUS at 07:35

## 2023-03-02 RX ADMIN — FENTANYL CITRATE 50 MCG: 50 INJECTION, SOLUTION INTRAMUSCULAR; INTRAVENOUS at 07:19

## 2023-03-02 RX ADMIN — FENTANYL CITRATE 25 MCG: 50 INJECTION, SOLUTION INTRAMUSCULAR; INTRAVENOUS at 08:40

## 2023-03-02 RX ADMIN — SODIUM CHLORIDE, POTASSIUM CHLORIDE, SODIUM LACTATE AND CALCIUM CHLORIDE: 600; 310; 30; 20 INJECTION, SOLUTION INTRAVENOUS at 08:03

## 2023-03-02 RX ADMIN — CEFAZOLIN 2 G: 330 INJECTION, POWDER, FOR SOLUTION INTRAMUSCULAR; INTRAVENOUS at 07:05

## 2023-03-02 RX ADMIN — ONDANSETRON 4 MG: 2 INJECTION INTRAMUSCULAR; INTRAVENOUS at 07:58

## 2023-03-02 RX ADMIN — PROPOFOL 150 MG: 10 INJECTION, EMULSION INTRAVENOUS at 07:06

## 2023-03-02 ASSESSMENT — PAIN DESCRIPTION - PAIN TYPE
TYPE: SURGICAL PAIN

## 2023-03-02 ASSESSMENT — FIBROSIS 4 INDEX: FIB4 SCORE: 1.13

## 2023-03-02 NOTE — ANESTHESIA TIME REPORT
Anesthesia Start and Stop Event Times     Date Time Event    3/2/2023 0649 Ready for Procedure     0659 Anesthesia Start     0818 Anesthesia Stop        Responsible Staff  03/02/23    Name Role Begin End    Lobo Nguyen M.D. Anesth 0659 0818        Overtime Reason:  no overtime (within assigned shift)    Comments:

## 2023-03-02 NOTE — ANESTHESIA PROCEDURE NOTES
Airway    Date/Time: 3/2/2023 7:07 AM  Performed by: Lobo Nguyen M.D.  Authorized by: Lobo Nguyen M.D.     Location:  OR  Urgency:  Elective  Difficult Airway: No    Indications for Airway Management:  Anesthesia      Spontaneous Ventilation: absent    Sedation Level:  Deep  Preoxygenated: Yes    Mask Difficulty Assessment:  0 - not attempted  Final Airway Type:  Supraglottic airway  Final Supraglottic Airway:  Standard LMA    SGA Size:  4  Number of Attempts at Approach:  1

## 2023-03-02 NOTE — ANESTHESIA PREPROCEDURE EVALUATION
Case: [id number] Date/Time: 03/02/23 0645    Procedures:       BILATERAL BREAST RECONSTRUCTION WITH REMOVAL OF TISSUE EXPANDERS AND PLACEMENT OF IMPLANTS, BREAST RECONSTRUCTION AND REVISION WITH DERMAL GLANDULAR FLAPS, CAPSULECTOMY AND FAT GRAFTING, LEFT BREAST MASTOPEXY      REMOVAL, TISSUE EXPANDER      FLAP GRAFT    Pre-op diagnosis: GENETIC SUSCEPTIBILITY TO MALIGNANT NEOPLASM OF BREAST    Location: [facility] ROOM 28 / SURGERY SAME DAY [facility]    Surgeons: Gary Guaman M.D.          Relevant Problems   Other   (positive) History of alcohol use disorder       Physical Exam    Airway   Mallampati: II  TM distance: >3 FB  Neck ROM: full       Cardiovascular - normal exam  Rhythm: regular  Rate: normal  (-) murmur     Dental - normal exam           Pulmonary - normal exam  Breath sounds clear to auscultation     Abdominal    Neurological - normal exam                 Anesthesia Plan    ASA 2       Plan - general       Airway plan will be LMA          Induction: intravenous    Postoperative Plan: Postoperative administration of opioids is intended.    Pertinent diagnostic labs and testing reviewed    Informed Consent:    Anesthetic plan and risks discussed with patient.    Use of blood products discussed with: patient whom consented to blood products.

## 2023-03-02 NOTE — OR SURGEON
Immediate Post OP Note    PreOp Diagnosis: BRCA      PostOp Diagnosis: same      Procedure(s):  BILATERAL BREAST RECONSTRUCTION WITH REMOVAL OF TISSUE EXPANDERS AND PLACEMENT OF IMPLANTS, BREAST RECONSTRUCTION AND REVISION WITH DERMAL GLANDULAR FLAPS, CAPSULECTOMY AND FAT GRAFTING, LEFT BREAST MASTOPEXY  REMOVAL, TISSUE EXPANDER  FLAP GRAFT    Surgeon(s):  Gary Guaman M.D.    Anesthesiologist/Type of Anesthesia:  Anesthesiologist: Lobo Nguyen M.D./* No anesthesia type entered *    Surgical Staff:  Circulator: Rasheeda Cifuentes R.N.  Scrub Person: Trini Jean-Baptiste    Specimens removed if any:  * No specimens in log *    Estimated Blood Loss: minimal    Findings: see operative note    Complications: no apparent    #dictated        3/2/2023 6:59 AM Gary Guaman M.D.

## 2023-03-02 NOTE — OR NURSING
0815 Patient arrived to PACU. Report received from anesthesia and OR RN. Patient on 6L of oxygen via mask. Placed on monitor. Patients surgical site CDI . Patient arousable.     0830 Pain medication given, pillows provided for repositioning.    0844 Oral pain medication given.     0855 Patients daughter updated on recovery. Patient tolerating sips of water. Patient has cramp in right buttocks, otherwise surgical pain tolerable.    0911 Discharge education dicussed with daughter over the phone, all questions answered. Patient has scripts at home.    0931 Patient discharged with daughter.  PIV removed. All belongings gathered and sent with patient.

## 2023-03-02 NOTE — DISCHARGE INSTRUCTIONS
"Discharge Instructions    Incision and Dressing Care:    Your incision, or scar, has both stitches and steri-strips, which are small white strips of tape, and is covered by a gauze dressing and tape or a plastic dressing.  Do not remove the dressing, steri-strips or stitches. We will remove the dressing in seven to 10 days. We also will remove the sutures in one to two weeks unless they absorb on their own. If the dressing or steri-strips fall off, do not attempt to replace them.     You may shower one day after surgery if you have a plastic dressing.     If you have gauze and paper tape, you may remove it two days after surgery and shower after that. Use a towel to dry your incision thoroughly after showering. Be careful not to touch or remove the steri-strips or sutures.     Bruising and some swelling are common in women after surgery.     A low-grade fever that is under 100 degrees Fahrenheit is normal the day after surgery.     Activity:    Avoid strenuous activity, heavy lifting and vigorous exercise until the stitches are removed. Tell your caregiver what you do and they will help you make a personal plan for \"what you can do when\" after surgery. Walking is a normal activity that can be restarted right away. You cannot do housework or driving until the drain is out.     You may restart driving when you are no longer on narcotics and you feel safe turning the wheel and stopping quickly.     You will be given exercises to regain movement and flexibility. You may be referred to physical therapy for additional rehabilitation if it is needed.     Most people return to work within three to six weeks. Return to work varies with your type of work, your overall health and personal preferences. Discuss returning to work with your provider.        If any questions arise, call your provider.  If your provider is not available, please feel free to call the [facility] at [phone].    MEDICATIONS: Resume taking " daily medication.  Take prescribed pain medication with food.  If no medication is prescribed, you may take non-aspirin pain medication if needed.  PAIN MEDICATION CAN BE VERY CONSTIPATING.  Take a stool softener or laxative such as senokot, pericolace, or milk of magnesia if needed.    Last pain medication given Oxycodone-acetaminophen (Percocet) at 8:45am.    What to Expect Post Anesthesia    Rest and take it easy for the first 24 hours.  A responsible adult is recommended to remain with you during that time.  It is normal to feel sleepy.  We encourage you to not do anything that requires balance, judgment or coordination.    FOR 24 HOURS DO NOT:  Drive, operate machinery or run household appliances.  Drink beer or alcoholic beverages.  Make important decisions or sign legal documents.    To avoid nausea, slowly advance diet as tolerated, avoiding spicy or greasy foods for the first day.  Add more substantial food to your diet according to your provider's instructions.  Babies can be fed formula or breast milk as soon as they are hungry.  INCREASE FLUIDS AND FIBER TO AVOID CONSTIPATION.    MILD FLU-LIKE SYMPTOMS ARE NORMAL.  YOU MAY EXPERIENCE GENERALIZED MUSCLE ACHES, THROAT IRRITATION, HEADACHE AND/OR SOME NAUSEA.

## 2023-03-02 NOTE — ANESTHESIA POSTPROCEDURE EVALUATION
Patient: Madison Cardona    Procedure Summary     Date: 03/02/23 Room / Location: [facility] ROOM 28 / SURGERY SAME DAY [facility]    Anesthesia Start: 0659 Anesthesia Stop: 0818    Procedures:       BILATERAL BREAST RECONSTRUCTION WITH REMOVAL OF TISSUE EXPANDERS AND PLACEMENT OF IMPLANTS, BREAST RECONSTRUCTION AND REVISION WITH DERMAL GLANDULAR FLAPS, CAPSULOTOMY AND FAT GRAFTING, LEFT BREAST MASTOPEXY (Bilateral: Breast)      REMOVAL, TISSUE EXPANDER (Bilateral: Breast)      FLAP GRAFT (Bilateral: Breast) Diagnosis: (GENETIC SUSCEPTIBILITY TO MALIGNANT NEOPLASM OF BREAST)    Surgeons: Gary Guaman M.D. Responsible Provider: Lobo Nguyen M.D.    Anesthesia Type: general ASA Status: 2          Final Anesthesia Type: general  Last vitals  BP   Blood Pressure: (!) 147/88    Temp   36.1 °C (97 °F)    Pulse   90   Resp   16    SpO2   94 %      Anesthesia Post Evaluation    Patient location during evaluation: PACU  Patient participation: complete - patient participated  Level of consciousness: awake and alert    Airway patency: patent  Anesthetic complications: no  Cardiovascular status: hemodynamically stable  Respiratory status: acceptable  Hydration status: euvolemic    PONV: none          No notable events documented.     Nurse Pain Score: 5 (NPRS)

## 2023-03-02 NOTE — OP REPORT
DATE OF SERVICE:  03/02/2023     PREOPERATIVE DIAGNOSES:  1.  Genetic susceptibility to breast cancer.  2.  Deformity of reconstructed breast.  3.  Asymmetry between reconstructed breasts.     POSTOPERATIVE DIAGNOSES:  1.  Genetic susceptibility to breast cancer.  2.  Deformity of reconstructed breast.  3.  Asymmetry between reconstructed breasts.     PROCEDURES:  1.  Bilateral tissue expander removal and gel implant placement.  2.  Bilateral medial capsulotomies and lateral capsulorrhaphies.  3.  Right inferiorly based dermoglandular flap for breast reconstruction, 45   cm2.  4.  Left inferiorly based dermoglandular flap for breast reconstruction, 20   cm2.  5.  Left mastopexy for symmetry.  6.  Bilateral breast reconstruction with fat grafting with placement of 75 mL   of fat per breast for a total of 150 mL of fat.     ATTENDING SURGEON:  Gary Guaman MD     ANESTHESIOLOGIST:  Lobo Nguyen MD     ASSISTANT:  None.     ESTIMATED BLOOD LOSS:  Minimal.     COMPLICATIONS:  No apparent.     SPECIMENS:  None.     INDICATIONS FOR PROCEDURE:  The patient is a 52-year-old woman who has a   genetic susceptibility to breast cancer and undergone bilateral nipple sparing   mastectomies and had a postoperative course that was complicated by partial   mastectomy, skin flap necrosis on the inferior pole that required revisional   surgery.  The patient has gone on to heal and had successful tissue expansion.    She does have asymmetry between her reconstructed breast and now presents   for the above operation.     INTRAOPERATIVE FINDINGS:  Allergan Style SSM Natrelle Inspira SoftTouch breast   implant 360 mL were placed bilaterally, reference # [id number]; on the right   hand side, the serial #[id number], on the left hand side, serial #[id number].     DESCRIPTION OF PROCEDURE:  After the operative and nonoperative options were   discussed including risks, benefits and alternatives, which included but was   not limited to  bleeding, infection, damage to surrounding structure, need for   further surgery, reaction to anesthetic agent, scarring, breast asymmetry,   contour irregularities, wound healing difficulties, need for revisional   surgery, implant failure, implant rupture, capsular contracture development,   breast implant illness, breast implant associated anaplastic large cell   lymphoma, wound healing difficulties, hypertrophic or keloid scarring,   dissatisfaction with the results and need for explantation, need for future   biopsy, hypertrophic or keloid scarring, deep venous thrombosis, pulmonary   embolus, myocardial infarction, stroke, and unsatisfactory result and/or   death, informed consent was obtained.  Preoperatively, the patient was   identified.  In sitting upright position, the patient's sternal notch, midline   and inframammary folds were marked.  The inferiorly based dermoglandular   flaps were marked out on the right and the left.  The mastopexy was drawn out   on the left breast in a crescent fashion above the nipple areolar complex.    Areas for pocket modifications were marked out.  Areas for fat graft harvest   were marked in the abdomen and flanks.  The patient had a previous open   laparotomy that she had when she was a child for an imperforate anus.    Abdominal exam was performed and the patient had no evidence of hernias.    Antibiotics were given.  Sequential compression devices were placed.  The   patient was brought to open general anesthesia was induced.  Her chest,   abdomen, flanks were prepped and draped in the usual sterile fashion.  Poke   incisions were made in the lower quadrant and the flanks.  Tumescent solution   was then placed in these locations.  Adequate time was allowed for hemostatic   effects of epinephrine to take effect.  Liposuction was then performed in the   flanks and the abdomen.  Once the fat was then harvested it was then washed 3   times per protocol using the Revolve fat  harvesting system.  The fat was then   loaded in 20 mL syringes and set on the back table.  The poke incision was   then closed with interrupted 5-0 fast absorbing suture.     We then turned attention to the left breast.  Incision was made along the   mastectomy scar inferiorly.  Cautery was used to dissect down through the   underlying tissue down to underlying capsule, which was opened up.  The tissue   expander was grasped and removed.  The pocket was then irrigated with   antibiotic irrigation.  At this point, a fairly extensive capsulotomies were   performed on the entire inferior pole of the breast and the medial aspect of   the breast implant pocket.  Then, out laterally, a capsulorrhaphy was then   performed with running locking 2-0 Vicryl suture.  The  like pattern   was then made with the cautery for the capsulotomies on the inferior pole.  In   doing so, it helped to open up the pocket.  The patient had an inframammary   fold than was higher on this side.  The patient was then put in upright   position, different sizers were tried out.  Ultimately I felt that a 360 mL   implant would be most appropriate.  While in an upright position, the   inferiorly based dermoglandular flap was then extended medially and laterally.    A portion of this tissue was then deepithelialized inferiorly.  A   dermoglandular flap was then created with cautery.  The flap was then   mobilized in inferior to superior direction.  This was then tucked under   superiorly to the breast implant capsule in a pants-over-vest fashion and   secured with 2-0 Vicryl sutures.  Once I was then happy with the overall shape   and size the standing cutaneous deformities were excised medially and   laterally with a 15 blade.  The patient was then put back down the supine   position.  The pocket was then copiously irrigated and then using new gloves   and minimal touch technique a 360 mL implant was then placed in the pocket.    The deep  tissue was then closed with 2-0 Vicryl sutures.  The dermoglandular   flap was then further secured with 2-0 Vicryl sutures.  All cutaneous   incisions were then closed with 3-0 deep dermal Monocryl sutures and running   4-0 Monocryl subcuticular stitches to close the overlying skin.     We then turned attention to the right breast.  Incision was made through the   old mastectomy scar.  Cautery was used to dissect down to the underlying   tissue down to underlying capsule and the tissue expander was grasped and   removed.  The pocket was then irrigated with antibiotic irrigation.    Capsulotomies were performed along the medial and superior aspect of the   capsule on this side.  A more aggressive capsulorrhaphy was then performed out   laterally with running locking 2-0 Vicryl sutures.  The patient was then sat   upright again.  Ultimately I felt that the 360 mL implant would be   appropriate.  While in an upright position, the patient had redundancy on this   side along the inframammary fold and so a superiorly based dermoglandular   flap was then marked out.  This was then incised with a 10 blade.  Cautery was   used to elevate and mobilize the dermoglandular flap.  A portion of tissue   was then deepithelialized.  The superiorly flap was then used to tack down   inferiorly to create a higher inframammary fold to more matched the left hand   side. A 2-0 Vicryl sutures were then used to tack down to the dermoglandular   flap to the deep tissue along the inframammary fold in a pants-over-vest   fashion with 2-0 Vicryl sutures.  Standing cutaneous deformities were excised   medially and laterally.  Once I was then happy with the overall shape and   contour, the patient was put back down the supine position.  The sizer was   removed.  The pocket was then irrigated with triple antibiotic irrigation and   Betadine.  Then, using new gloves and minimal touch technique, 360 mL implant   was then placed in the pocket.  The  deep tissue was then closed with 2-0   Vicryl sutures.  All cutaneous incisions were then closed with 3-0 deep dermal   Monocryl sutures and running 4-0 Monocryl subcuticular stitches to close the   overlying skin.     We then turned our attention to the mastopexy on the left hand side, a   crescent incision was made with a 15 blade.  Tissue was then deepithelialized   with a 15 blade.  Cautery was used to elevate and mobilize the tissue in   inferior to superior direction.  The mastopexy was then closed with 3-0 deep   dermal Monocryl sutures and running 4-0 Monocryl subcuticular stitches to   close the overlying skin.     A poke incision was made along the upper medial aspect of the breast   bilaterally.  Fat was sequentially grafted using a fanning technique.  Care   was taken to ensure that equal amounts of fat were then placed these passed   through the cannula.  About 75 mL of fat was then placed in each breast, for a   total of 150 mL The fat was then further molded into position.  The poke   incisions were then closed with interrupted 5-0 fast absorbing suture.  The   patient was then washed.  Steri-Strips and compressive dressings were then   placed.  The patient was then awakened, extubated and transferred to PACU in   stable condition.  At the end of the procedure, all sponge, instrument and   needle counts were correct.        ______________________________  MD JONNY WHITAKER/CHRISTIANE    DD:  03/02/2023 08:22  DT:  03/02/2023 09:04    Job#:  [id number]

## 2023-10-18 ENCOUNTER — OFFICE VISIT (OUTPATIENT)
Dept: SURGERY | Facility: MEDICAL CENTER | Age: 53
End: 2023-10-18
Payer: COMMERCIAL

## 2023-10-18 VITALS
OXYGEN SATURATION: 96 % | DIASTOLIC BLOOD PRESSURE: 82 MMHG | HEART RATE: 92 BPM | SYSTOLIC BLOOD PRESSURE: 126 MMHG | TEMPERATURE: 97 F | BODY MASS INDEX: 26.46 KG/M2 | HEIGHT: 62 IN | WEIGHT: 143.8 LBS

## 2023-10-18 DIAGNOSIS — Z15.09 BRCA1 GENE MUTATION POSITIVE IN FEMALE: ICD-10-CM

## 2023-10-18 DIAGNOSIS — Z15.02 BRCA1 GENE MUTATION POSITIVE IN FEMALE: ICD-10-CM

## 2023-10-18 DIAGNOSIS — Z15.01 BRCA1 GENE MUTATION POSITIVE IN FEMALE: ICD-10-CM

## 2023-10-18 PROCEDURE — 99214 OFFICE O/P EST MOD 30 MIN: CPT | Performed by: SURGERY

## 2023-10-18 ASSESSMENT — ENCOUNTER SYMPTOMS
PSYCHIATRIC NEGATIVE: 1
EYES NEGATIVE: 1
CONSTITUTIONAL NEGATIVE: 1
GASTROINTESTINAL NEGATIVE: 1
MUSCULOSKELETAL NEGATIVE: 1
NEUROLOGICAL NEGATIVE: 1
CARDIOVASCULAR NEGATIVE: 1
RESPIRATORY NEGATIVE: 1

## 2023-10-18 ASSESSMENT — PATIENT HEALTH QUESTIONNAIRE - PHQ9: CLINICAL INTERPRETATION OF PHQ2 SCORE: 0

## 2023-10-18 ASSESSMENT — FIBROSIS 4 INDEX: FIB4 SCORE: 1.15

## 2023-10-18 NOTE — PROGRESS NOTES
10/18/2023  5:59 AM    Primary care provider:  Charity Sanchez M.D.  Plastic surgeon:  Dr. Guaman     CC:   Chief Complaint   Patient presents with    Follow-Up        HPI: This very pleasant 53 y.o. female returns for routine followup appointment after bilateral total mastectomies and reconstruction for BRCA1 mutation.  She completed second phase of reconstruction 3/2/23 with Dr. Guaman.  She is very happy overall with the care that she received, but notes some dissatisfaction with the final cosmetic outcome.  She is learning to live with the left lower inner scar, but has developed significant rippling bilaterally.      She remains somewhat active with biking and indoor rock climbing, but does not have a regular exercise regimen.  She tells me she is kind of lazy, but has been thinking about strategies to create healthy gym habits.  She does eat very well, focusing on proteins and vegetables with small amounts of beans and rice.        No Known Allergies  Current Outpatient Medications   Medication Sig    Multiple Vitamins-Minerals (MULTIVITAMIN ADULTS 50+ PO) Take 1 Tablet by mouth every day. (Patient not taking: Reported on 2/24/2023)    CALCIUM PO Take 1 Tablet by mouth every day. (Patient not taking: Reported on 2/24/2023)    VITAMIN D PO Take 1 Tablet by mouth every day. (Patient not taking: Reported on 2/24/2023)       Patient Active Problem List   Diagnosis    H/O: hysterectomy    Spinal stenosis in cervical region    Epigastric pain    History of gastrointestinal surgery    Hyperlipidemia    Elevated total protein    Thrombocytopenia (HCC)    Elevated hemoglobin (HCC)    Leukopenia    History of alcohol use disorder    Pelvic peritoneal adhesions, female    BRCA1 gene mutation positive in female     Past Surgical History:   Procedure Laterality Date    BREAST RECONSTRUCTION Bilateral 03/02/2023    Procedure: BILATERAL BREAST RECONSTRUCTION WITH REMOVAL OF TISSUE EXPANDERS AND PLACEMENT OF IMPLANTS,  BREAST RECONSTRUCTION AND REVISION WITH DERMAL GLANDULAR FLAPS, CAPSULOTOMY AND FAT GRAFTING, LEFT BREAST MASTOPEXY;  Surgeon: Gary Guaman M.D.;  Location: SURGERY SAME DAY [facility];  Service: Plastics    TISSUE EXPANDER PLACE/REMOVE Bilateral 03/02/2023    Procedure: REMOVAL, TISSUE EXPANDER;  Surgeon: Gary Guaman M.D.;  Location: SURGERY SAME DAY [facility];  Service: Plastics    FLAP GRAFT Bilateral 03/02/2023    Procedure: FLAP GRAFT;  Surgeon: Gary Guaman M.D.;  Location: SURGERY SAME DAY [facility];  Service: Plastics    MASTECTOMY Bilateral 09/29/2022    Procedure: BILATERAL TOTAL MASTECTOMIES WITH NIPPLE SPARING IF ABLE;  Surgeon: Elizabet Mcbride M.D.;  Location: SURGERY SAME DAY [facility];  Service: General    BREAST RECONSTRUCTION Bilateral 09/29/2022    Procedure: BILTERAL BREAST RECONSTRUCTION WITH PLACEMENT OF TISSUE EXPANDERS AND USE OF ACELLULAR DERMAL MATRIX;  Surgeon: Gary Guaman M.D.;  Location: SURGERY SAME DAY [facility];  Service: General    TISSUE EXPANDER PLACE/REMOVE Bilateral 09/29/2022    Procedure: INSERTION, TISSUE EXPANDER;  Surgeon: Gary Guaman M.D.;  Location: SURGERY SAME DAY [facility];  Service: General    SALPINGO OOPHORECTOMY  03/17/2022    Procedure: LAPAROSCOPIC BILATERAL SALPINGO-OOPHORECTOMY, ADHESIOLYSIS;  Surgeon: Lb Patel M.D.;  Location: [facility];  Service: Gynecology    CERVICAL DISK AND FUSION ANTERIOR  01/10/2018    Procedure: CERVICAL DISK AND FUSION ANTERIOR/C4-5;  Surgeon: Elvis De Leon M.D.;  Location: [facility];  Service: Orthopedics    COLOSTOMY CLOSURE  1971    ABDOMINAL HYSTERECTOMY TOTAL      GYN SURGERY      hysterectomy    OTHER      DENTAL IMPLANTs    OTHER ABDOMINAL SURGERY  1970/71    colostomy/imperforate anus       Social History     Tobacco Use    Smoking status: Never    Smokeless tobacco: Never   Vaping Use    Vaping Use: Never used   Substance Use Topics    Alcohol use: Yes      "Alcohol/week: 1.8 oz     Types: 3 Glasses of wine per week     Comment: 5- 7 per week    Drug use: No     Family and Occupational History     Socioeconomic History    Marital status:      Spouse name: Not on file    Number of children: Not on file    Years of education: Not on file    Highest education level: Not on file   Occupational History    Not on file     OB History    Para Term  AB Living   2 2 0 0 0 0   SAB IAB Ectopic Molar Multiple Live Births   0 0 0 0 0 0   Obstetric Comments   Age of first delivery: 26       Family History   Problem Relation Age of Onset    Cancer Mother 46        breast    Alcohol/Drug Father         heroin addict    Lung Disease Father     Ovarian Cancer Sister 50    Alcohol/Drug Paternal Aunt        Review of Systems   Constitutional: Negative.    HENT: Negative.     Eyes: Negative.    Respiratory: Negative.     Cardiovascular: Negative.    Gastrointestinal: Negative.    Genitourinary: Negative.    Musculoskeletal: Negative.    Skin: Negative.    Neurological: Negative.    Psychiatric/Behavioral: Negative.         Objective:  /82 (BP Location: Left arm, Patient Position: Sitting, BP Cuff Size: Large adult)   Pulse 92   Temp 36.1 °C (97 °F) (Temporal)   Ht 1.575 m (5' 2\")   Wt 65.2 kg (143 lb 12.8 oz)   LMP 10/18/2011 (Approximate) Comment: Age of first period: 12  SpO2 96%   BMI 26.30 kg/m²     Physical Exam    General: Very pleasant demeanor.  Animated and in good spirits.  Appears well, no acute distress.  Surgical site:  Bilateral NSM have healed well, with healthy NAC.   No suspicious findings on exam.  There is visible rippling from excess breast skin.    See scanned diagram    Diagnosis:  1. BRCA1 gene mutation positive in female            ASSESSMENT:  Pathogenic BRCAl mutation, confirmed. B-NSM, prepectoral TE/ACOM (Dr. Guaman) 22  [facility]:  Benign breast tissue including retroareolar samples. LEFT inferior mastectomy flap necrosis " requiring excision and removal of TE.  Subsequent reconstruction completed 3/2/23.      Mother with breast cancer (succumbed, age 48). Sister with stage IV ovarian cancer, also BRCA 1 positive.       No tobacco use.  5 alcohol per week.  BMI 24    DISCUSSED/PLAN:    We discussed options for improvement of her concerns, including possible exchange for larger implants, and I advised her to discuss further with Dr. Guaman.      I recommended resistance training to help add lean body mass, and we discussed strategies including starting small but staying consistent.  She has good insight into her strengths and weaknesses, and already makes generally good food choices.      I advised her to continue monitoring her breast/chest area for changes, and reminded her that while bilateral total mastectomies are considered maximum risk reduction, she should still remain vigilant, and cancer can rarely still occur.  I will be happy to see her back if there are any concerning changes.  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

## 2024-01-22 ENCOUNTER — HOSPITAL ENCOUNTER (OUTPATIENT)
Dept: LAB | Facility: MEDICAL CENTER | Age: 54
End: 2024-01-22
Attending: PLASTIC SURGERY
Payer: COMMERCIAL

## 2024-01-22 LAB
BASOPHILS # BLD AUTO: 0.3 % (ref 0–1.8)
BASOPHILS # BLD: 0.02 K/UL (ref 0–0.12)
EOSINOPHIL # BLD AUTO: 0.09 K/UL (ref 0–0.51)
EOSINOPHIL NFR BLD: 1.4 % (ref 0–6.9)
ERYTHROCYTE [DISTWIDTH] IN BLOOD BY AUTOMATED COUNT: 43.3 FL (ref 35.9–50)
HCT VFR BLD AUTO: 41.6 % (ref 37–47)
HGB BLD-MCNC: 13.7 G/DL (ref 12–16)
IMM GRANULOCYTES # BLD AUTO: 0.02 K/UL (ref 0–0.11)
IMM GRANULOCYTES NFR BLD AUTO: 0.3 % (ref 0–0.9)
LYMPHOCYTES # BLD AUTO: 2.34 K/UL (ref 1–4.8)
LYMPHOCYTES NFR BLD: 35.6 % (ref 22–41)
MCH RBC QN AUTO: 30.4 PG (ref 27–33)
MCHC RBC AUTO-ENTMCNC: 32.9 G/DL (ref 32.2–35.5)
MCV RBC AUTO: 92.4 FL (ref 81.4–97.8)
MONOCYTES # BLD AUTO: 0.54 K/UL (ref 0–0.85)
MONOCYTES NFR BLD AUTO: 8.2 % (ref 0–13.4)
NEUTROPHILS # BLD AUTO: 3.57 K/UL (ref 1.82–7.42)
NEUTROPHILS NFR BLD: 54.2 % (ref 44–72)
NRBC # BLD AUTO: 0 K/UL
NRBC BLD-RTO: 0 /100 WBC (ref 0–0.2)
PLATELET # BLD AUTO: 188 K/UL (ref 164–446)
PMV BLD AUTO: 9.8 FL (ref 9–12.9)
RBC # BLD AUTO: 4.5 M/UL (ref 4.2–5.4)
WBC # BLD AUTO: 6.6 K/UL (ref 4.8–10.8)

## 2024-01-22 PROCEDURE — 85025 COMPLETE CBC W/AUTO DIFF WBC: CPT

## 2024-01-22 PROCEDURE — 36415 COLL VENOUS BLD VENIPUNCTURE: CPT

## 2024-05-25 ENCOUNTER — APPOINTMENT (OUTPATIENT)
Dept: URGENT CARE | Facility: PHYSICIAN GROUP | Age: 54
End: 2024-05-25
Payer: COMMERCIAL

## 2024-11-04 DIAGNOSIS — Z20.828 EXPOSURE TO THE FLU: ICD-10-CM

## 2024-11-04 RX ORDER — OSELTAMIVIR PHOSPHATE 75 MG/1
75 CAPSULE ORAL 2 TIMES DAILY
Qty: 10 CAPSULE | Refills: 0 | Status: SHIPPED | OUTPATIENT
Start: 2024-11-04 | End: 2024-11-09

## 2024-11-05 NOTE — PROGRESS NOTES
Patient has been exposed to influenza A by her daughter. Prophylactic course of tamiflu sent to pharmacy.

## (undated) DEVICE — SUTURE 4-0 MONOCRYL PLUS PS-2 - 27 INCH (36/BX)

## (undated) DEVICE — STAPLER SKIN DISP - (6/BX 10BX/CA) VISISTAT

## (undated) DEVICE — ELECTRODE DUAL RETURN W/ CORD - (50/PK)

## (undated) DEVICE — KIT ANESTHESIA W/CIRCUIT & 3/LT BAG W/FILTER (20EA/CA)

## (undated) DEVICE — LACTATED RINGERS INJ 1000 ML - (14EA/CA 60CA/PF)

## (undated) DEVICE — GLOVE BIOGEL INDICATOR SZ 8.5 SURGICAL PF LTX - (50/BX 4BX/CA)

## (undated) DEVICE — SUTURE 2-0 VICRYL PLUS SH - 8 X 18 INCH (12/BX)

## (undated) DEVICE — DRAIN JACKSON PRATT 15FR - (10EA/CA)

## (undated) DEVICE — INTRAOP NEURO IN OR 1:1 PER 15 MIN

## (undated) DEVICE — SET TUBING PNEUMOCLEAR HIGH FLOW SMOKE EVACUATION (10EA/BX)

## (undated) DEVICE — SUTURE 3-0 MONOCRYL PLUS PS-2 - (12/BX)

## (undated) DEVICE — NEEDLE INSFL 120MM 14GA VRRS - (20/BX)

## (undated) DEVICE — HEAD HOLDER JUNIOR/ADULT

## (undated) DEVICE — GLOVE BIOGEL SZ 6 PF LATEX - (50EA/BX 4BX/CA)

## (undated) DEVICE — LUBRICANT INSTRUMENT 4 ML ELECTRO LUBE (20EA/BX)

## (undated) DEVICE — KIT EVACUATER 3 SPRING PVC LF 1/8 DRAIN SIZE (10EA/CA)"

## (undated) DEVICE — CONTAINER SPECIMEN BAG OR - STERILE 4 OZ W/LID (100EA/CA)

## (undated) DEVICE — PAD SANITARY 11IN MAXI IND WRAPPED  (12EA/PK 24PK/CA)

## (undated) DEVICE — GLOVE BIOGEL INDICATOR SZ 6.5 SURGICAL PF LTX - (50PR/BX 4BX/CA)

## (undated) DEVICE — MASK OXYGEN VNYL ADLT MED CONC WITH 7 FOOT TUBING  - (50EA/CA)

## (undated) DEVICE — GOWN SURGEONS X-LARGE - DISP. (30/CA)

## (undated) DEVICE — SLEEVE, VASO, THIGH, MED

## (undated) DEVICE — TOOL DISSECT MATCH HEAD

## (undated) DEVICE — SENSOR OXIMETER ADULT SPO2 RD SET (20EA/BX)

## (undated) DEVICE — MIDAS LUBRICATOR DIFFUSER PACK (4EA/CA)

## (undated) DEVICE — SET EXTENSION WITH 2 PORTS (48EA/CA) ***PART #2C8610 IS A SUBSTITUTE*****

## (undated) DEVICE — GLOVE SZ 7 BIOGEL PI MICRO - PF LF (50PR/BX 4BX/CA)

## (undated) DEVICE — GLOVE BIOGEL PI INDICATOR SZ 7.5 SURGICAL PF LF -(50/BX 4BX/CA)

## (undated) DEVICE — SYRINGE 20 ML LL (50EA/BX 4BX/CA)

## (undated) DEVICE — SPONGE TONSIL MEDIUM XRAY STERILE 1 - (5/PK 20PK/CA)"

## (undated) DEVICE — TUBING CLEARLINK DUO-VENT - C-FLO (48EA/CA)

## (undated) DEVICE — DRESSING ABDOMINAL PAD STERILE 8 X 10" (360EA/CA)"

## (undated) DEVICE — PLUMEPEN ULTRA 3/8 IN X 10 FT HOSE (20EA/CA)

## (undated) DEVICE — KIT SURGIFLO W/OUT THROMBIN - (6EA/CA)

## (undated) DEVICE — TOWEL STOP TIMEOUT SAFETY FLAG (40EA/CA)

## (undated) DEVICE — SENSOR SPO2 NEO LNCS ADHESIVE (20/BX) SEE USER NOTES

## (undated) DEVICE — SUTURE 2-0 SILK SH (36PK/BX)

## (undated) DEVICE — SUCTION INSTRUMENT YANKAUER BULBOUS TIP W/O VENT (50EA/CA)

## (undated) DEVICE — SODIUM CHL IRRIGATION 0.9% 1000ML (12EA/CA)

## (undated) DEVICE — SUTURE 2-0 ETHIBOND CT-2 (12PK/BX)

## (undated) DEVICE — Device

## (undated) DEVICE — SUTURE 4-0 30CM STRATAFIX SPIRAL PS-2 (12EA/BX)

## (undated) DEVICE — BOVIE BLADE COATED &INSULATED - 25/PK

## (undated) DEVICE — TOWELS CLOTH SURGICAL - (4/PK 20PK/CA)

## (undated) DEVICE — NEEDLE SPINAL NON-SAFETY 18 GA X 3 IN (25EA/BX)

## (undated) DEVICE — CANNULA W/ SUPPLY TUBING O2 - (50/CA)

## (undated) DEVICE — MEDICINE CUP STERILE 2 OZ - (100/CA)

## (undated) DEVICE — TUBE CONNECTING SUCTION - CLEAR PLASTIC STERILE 72 IN (50EA/CA)

## (undated) DEVICE — DRESSING TRANSPARENT FILM TEGADERM 2.375 X 2.75"  (100EA/BX)"

## (undated) DEVICE — CANISTER SUCTION 3000ML MECHANICAL FILTER AUTO SHUTOFF MEDI-VAC NONSTERILE LF DISP  (40EA/CA)

## (undated) DEVICE — SET LEADWIRE 5 LEAD BEDSIDE DISPOSABLE ECG (1SET OF 5/EA)

## (undated) DEVICE — STERI STRIP COMPOUND BENZOIN - TINCTURE 0.6ML WITH APPLICATOR (40EA/BX)

## (undated) DEVICE — PROTECTOR ULNA NERVE - (36PR/CA)

## (undated) DEVICE — GLOVE BIOGEL INDICATOR SZ 7.5 SURGICAL PF LTX - (50PR/BX 4BX/CA)

## (undated) DEVICE — DRAPE MICROSCOPE X-LONG (10EA/CA)

## (undated) DEVICE — BANDAID SHEER STRIP 3/4 IN (100EA/BX 12BX/CA)

## (undated) DEVICE — ELECTRODE 850 FOAM ADHESIVE - HYDROGEL RADIOTRNSPRNT (50/PK)

## (undated) DEVICE — WATER IRRIGATION STERILE 1000ML (12EA/CA)

## (undated) DEVICE — SUTURE GENERAL

## (undated) DEVICE — SYRINGE 50ML CATHETER TIP (40EA/BX 4BX/CA)

## (undated) DEVICE — RETRACTOR 7.5 IN X 10.5 IN BLADE COAGULATION PHOTON

## (undated) DEVICE — SPONGE XRAY 8X4 STERL. 12PL - (10EA/TY 80TY/CA)

## (undated) DEVICE — GOWN WARMING STANDARD FLEX - (30/CA)

## (undated) DEVICE — GLOVE BIOGEL SZ 8 SURGICAL PF LTX - (50PR/BX 4BX/CA)

## (undated) DEVICE — LACTATED RINGERS INJ. 500 ML - (24EA/CA)

## (undated) DEVICE — TROCAR Z THREAD 11 X 100 - BLADED (6/BX)

## (undated) DEVICE — DRAPE IOBAN II INCISE 23X17 - (10EA/BX 4BX/CA)

## (undated) DEVICE — DRESSING TRANSPARENT FILM TEGADERM 4 X 4.75" (50EA/BX)"

## (undated) DEVICE — SUTURE 4-0 VICRYL PLUS FS-2 - 27 INCH (36/BX)

## (undated) DEVICE — SOLUTION PREP PVP IODINE 3/4 OZ POUCH PACKET CONTAINER STERILE LATEX FREE

## (undated) DEVICE — SET TUBE IRRIGATION (5/BX)

## (undated) DEVICE — SYRINGE 30 ML LL (56/BX)

## (undated) DEVICE — TROCAR5X55 KII SHIELDED SYS - (6/BX)

## (undated) DEVICE — SPONGE GAUZESTER. 2X2 4-PL - (2/PK 50PK/BX 30BX/CS)

## (undated) DEVICE — SPONGE PEANUT - (5/PK 50PK/CA)

## (undated) DEVICE — SCREW DISTRACTION 14MM YELLOW - STERILE (10EA/BX) (5TX4=20)

## (undated) DEVICE — CANISTER SUCTION RIGID RED 1500CC (40EA/CA)

## (undated) DEVICE — MASK AIRWAY SIZE 3 UNIQUE SILICON (10/BX)

## (undated) DEVICE — SYSTEM PEEL & PLACE 13CM INCISIONS

## (undated) DEVICE — PAD LAP STERILE 18 X 18 - (5/PK 40PK/CA)

## (undated) DEVICE — TROCARCANN&SEAL 5X55 ZTHREAD - 12/BX

## (undated) DEVICE — MASK ANESTHESIA ADULT  - (100/CA)

## (undated) DEVICE — CLOSURE SKIN STRIP 1/2 X 4 IN - (STERI STRIP) (50/BX 4BX/CA)

## (undated) DEVICE — KIT ROOM DECONTAMINATION

## (undated) DEVICE — SUTURE 0 VICRYL PLUS CT-2 - 27 INCH (36/BX)

## (undated) DEVICE — SUTURE 3-0 MONOCRYL PLUS PS-1 - 27 INCH (36/BX)

## (undated) DEVICE — BINDER BREAST FLORAL PINK LARGE 36-40 (1EA)"

## (undated) DEVICE — NEPTUNE 4 PORT MANIFOLD - (20/PK)

## (undated) DEVICE — SUTURE 3-0 VICRYL PLUS SH - 8X 18 INCH (12/BX)

## (undated) DEVICE — PACK BREAST RECONSTRUCTION (2EA/CA)

## (undated) DEVICE — KIT SPY-PHI DRUG DRAPE (6EA/BX)

## (undated) DEVICE — CHLORAPREP 26 ML APPLICATOR - ORANGE TINT(25/CA)

## (undated) DEVICE — PIN TEMPORARY FIXATION

## (undated) DEVICE — BANDAID X-LARGE 2 X 4 IN LF (50EA/BX)

## (undated) DEVICE — GLOVE BIOGEL SZ 7.5 SURGICAL PF LTX - (50PR/BX 4BX/CA)

## (undated) DEVICE — GLOVE BIOGEL SZ 6.5 SURGICAL PF LTX (50PR/BX 4BX/CA)

## (undated) DEVICE — TUBING TUMESENCE - 10/BX

## (undated) DEVICE — SLEEVE VASO CALF MED - (10PR/CA)

## (undated) DEVICE — RESERVOIR SUCTION 100 CC - SILICONE (20EA/CA)

## (undated) DEVICE — SPONGE GAUZESTER 4 X 4 4PLY - (128PK/CA)

## (undated) DEVICE — GLOVE BIOGEL PI INDICATOR SZ 8.0 SURGICAL PF LF -(50/BX 4BX/CA)

## (undated) DEVICE — GLOVE SZ 7.5 BIOGEL PI MICRO - PF LF (50PR/BX)

## (undated) DEVICE — TUBING C&T SET FLYING LEADS DRAIN TUBING (10EA/BX)

## (undated) DEVICE — DRAPE STRLE REG TOWEL 18X24 - (10/BX 4BX/CA)"

## (undated) DEVICE — BANDAGE ELASTIC 6 HONEYCOMB - 6X5YD LF (20/CA)"

## (undated) DEVICE — GLOVE SZ 6 BIOGEL PI MICRO - PF LF (50PR/BX 4BX/CA)

## (undated) DEVICE — PACK LAPAROSCOPY - (1/CA)

## (undated) DEVICE — CLOSURE SKIN STRIP 1 X 5 INCH (25EA/BX 4BX/CA)

## (undated) DEVICE — GLOVE BIOGEL PI INDICATOR SZ 6.5 SURGICAL PF LF - (50/BX 4BX/CA)

## (undated) DEVICE — SYRINGE SAFETY 10 ML 18 GA X 1 1/2 BLUNT LL (100/BX 4BX/CA)

## (undated) DEVICE — GUIDE PHOTON WIDE FLAT

## (undated) DEVICE — SPANDAGE CHEST SZ10 25YDS - STRETCH (21 EA/CA 1RL/CA)

## (undated) DEVICE — KIT  I.V. START (100EA/CA)

## (undated) DEVICE — SHEET TRANSVERSE LAP - (12EA/CA)

## (undated) DEVICE — BINDER BREAST FLORAL PINK MEDIUM 34-36 (1EA)"

## (undated) DEVICE — DRESSING ANTIMICROBIAL BIOPATCH 4.0M (40EA/CA)

## (undated) DEVICE — GLOVE BIOGEL SZ 7 SURGICAL PF LTX - (50PR/BX 4BX/CA)

## (undated) DEVICE — PAD MAGNETIC INSTRUMENT FOAM HOLDER (200/CA)

## (undated) DEVICE — DRAPE LARGE 3 QUARTER - (20/CA)

## (undated) DEVICE — SET MULTI-ADD FLUID TRANSFER 42 INCH - (50/CA)THIS IS A CUSTOM MADE ITEM 4 TO 6 WEEK LEAD TIME

## (undated) DEVICE — RESTRAINTS LIMB DISP. - (12/BX 4BX/CA)

## (undated) DEVICE — SHEET PEDIATRIC LAPAROTOMY - (10/CA)

## (undated) DEVICE — PEN SKIN MARKER W/RULER - (50EA/BX)

## (undated) DEVICE — SUCTION TIP STRAIGHT ARGYLE - 50EA/CA

## (undated) DEVICE — GRAFTING FAT REVOLVE (1EA/PK)

## (undated) DEVICE — SYRINGE 3 CC 21 GA X 1-1/2 - NDL SAFETY (50/BX 8BX/CA)

## (undated) DEVICE — SET SUCTION/IRRIGATION WITH DISPOSABLE TIP (6/CA )PART #0250-070-520 IS A SUB

## (undated) DEVICE — TUBE E-T HI-LO CUFF 7.0MM (10EA/PK)

## (undated) DEVICE — PACK NEURO - (2EA/CA)

## (undated) DEVICE — SOD. CHL. INJ. 0.9% 250 ML - (36/CA 50CA/PF)

## (undated) DEVICE — SUTURE 5-0 PLAIN GUT PC-1 - (12/BX)